# Patient Record
Sex: FEMALE | Race: WHITE | Employment: UNEMPLOYED | URBAN - METROPOLITAN AREA
[De-identification: names, ages, dates, MRNs, and addresses within clinical notes are randomized per-mention and may not be internally consistent; named-entity substitution may affect disease eponyms.]

---

## 2021-12-06 ENCOUNTER — HOSPITAL ENCOUNTER (INPATIENT)
Age: 61
LOS: 5 days | Discharge: NURSING, REHAB OR LONG-TERM CARE | DRG: 723 | End: 2021-12-11
Attending: EMERGENCY MEDICINE | Admitting: INTERNAL MEDICINE
Payer: COMMERCIAL

## 2021-12-06 ENCOUNTER — APPOINTMENT (OUTPATIENT)
Dept: CT IMAGING | Age: 61
DRG: 723 | End: 2021-12-06
Payer: COMMERCIAL

## 2021-12-06 ENCOUNTER — APPOINTMENT (OUTPATIENT)
Dept: GENERAL RADIOLOGY | Age: 61
DRG: 723 | End: 2021-12-06
Payer: COMMERCIAL

## 2021-12-06 DIAGNOSIS — R41.82 ALTERED MENTAL STATUS, UNSPECIFIED ALTERED MENTAL STATUS TYPE: Primary | ICD-10-CM

## 2021-12-06 LAB
ADENOVIRUS BY PCR: NOT DETECTED
ALBUMIN SERPL-MCNC: 3.8 G/DL (ref 3.5–5.2)
ALP BLD-CCNC: 113 U/L (ref 35–104)
ALT SERPL-CCNC: 15 U/L (ref 0–32)
ANION GAP SERPL CALCULATED.3IONS-SCNC: 11 MMOL/L (ref 7–16)
AST SERPL-CCNC: 41 U/L (ref 0–31)
BACTERIA: NORMAL /HPF
BASOPHILS ABSOLUTE: 0.06 E9/L (ref 0–0.2)
BASOPHILS RELATIVE PERCENT: 0.7 % (ref 0–2)
BILIRUB SERPL-MCNC: 0.3 MG/DL (ref 0–1.2)
BILIRUBIN URINE: NEGATIVE
BLOOD, URINE: NEGATIVE
BORDETELLA PARAPERTUSSIS BY PCR: NOT DETECTED
BORDETELLA PERTUSSIS BY PCR: NOT DETECTED
BUN BLDV-MCNC: 8 MG/DL (ref 6–23)
CALCIUM SERPL-MCNC: 8.7 MG/DL (ref 8.6–10.2)
CHLAMYDOPHILIA PNEUMONIAE BY PCR: NOT DETECTED
CHLORIDE BLD-SCNC: 99 MMOL/L (ref 98–107)
CLARITY: CLEAR
CO2: 21 MMOL/L (ref 22–29)
COLOR: YELLOW
CORONAVIRUS 229E BY PCR: NOT DETECTED
CORONAVIRUS HKU1 BY PCR: NOT DETECTED
CORONAVIRUS NL63 BY PCR: NOT DETECTED
CORONAVIRUS OC43 BY PCR: NOT DETECTED
CREAT SERPL-MCNC: 0.7 MG/DL (ref 0.5–1)
EKG ATRIAL RATE: 107 BPM
EKG P AXIS: 64 DEGREES
EKG P-R INTERVAL: 144 MS
EKG Q-T INTERVAL: 320 MS
EKG QRS DURATION: 72 MS
EKG QTC CALCULATION (BAZETT): 427 MS
EKG R AXIS: 68 DEGREES
EKG T AXIS: 64 DEGREES
EKG VENTRICULAR RATE: 107 BPM
EOSINOPHILS ABSOLUTE: 0.01 E9/L (ref 0.05–0.5)
EOSINOPHILS RELATIVE PERCENT: 0.1 % (ref 0–6)
EPITHELIAL CELLS, UA: NORMAL /HPF
GFR AFRICAN AMERICAN: >60
GFR NON-AFRICAN AMERICAN: >60 ML/MIN/1.73
GLUCOSE BLD-MCNC: 104 MG/DL (ref 74–99)
GLUCOSE URINE: NEGATIVE MG/DL
HCT VFR BLD CALC: 42.1 % (ref 34–48)
HEMOGLOBIN: 14.4 G/DL (ref 11.5–15.5)
HUMAN METAPNEUMOVIRUS BY PCR: NOT DETECTED
HUMAN RHINOVIRUS/ENTEROVIRUS BY PCR: DETECTED
IMMATURE GRANULOCYTES #: 0.02 E9/L
IMMATURE GRANULOCYTES %: 0.2 % (ref 0–5)
INFLUENZA A BY PCR: NOT DETECTED
INFLUENZA B BY PCR: NOT DETECTED
KETONES, URINE: NEGATIVE MG/DL
LACTIC ACID: 1.2 MMOL/L (ref 0.5–2.2)
LEUKOCYTE ESTERASE, URINE: NEGATIVE
LIPASE: 17 U/L (ref 13–60)
LYMPHOCYTES ABSOLUTE: 2.5 E9/L (ref 1.5–4)
LYMPHOCYTES RELATIVE PERCENT: 29.9 % (ref 20–42)
MCH RBC QN AUTO: 31.4 PG (ref 26–35)
MCHC RBC AUTO-ENTMCNC: 34.2 % (ref 32–34.5)
MCV RBC AUTO: 91.9 FL (ref 80–99.9)
MONOCYTES ABSOLUTE: 0.87 E9/L (ref 0.1–0.95)
MONOCYTES RELATIVE PERCENT: 10.4 % (ref 2–12)
MYCOPLASMA PNEUMONIAE BY PCR: NOT DETECTED
NEUTROPHILS ABSOLUTE: 4.91 E9/L (ref 1.8–7.3)
NEUTROPHILS RELATIVE PERCENT: 58.7 % (ref 43–80)
NITRITE, URINE: NEGATIVE
PARAINFLUENZA VIRUS 1 BY PCR: NOT DETECTED
PARAINFLUENZA VIRUS 2 BY PCR: NOT DETECTED
PARAINFLUENZA VIRUS 3 BY PCR: NOT DETECTED
PARAINFLUENZA VIRUS 4 BY PCR: NOT DETECTED
PDW BLD-RTO: 12.5 FL (ref 11.5–15)
PH UA: 7 (ref 5–9)
PLATELET # BLD: 185 E9/L (ref 130–450)
PMV BLD AUTO: 9.9 FL (ref 7–12)
POTASSIUM REFLEX MAGNESIUM: 5.5 MMOL/L (ref 3.5–5)
POTASSIUM SERPL-SCNC: 4.3 MMOL/L (ref 3.5–5)
PROTEIN UA: NEGATIVE MG/DL
RBC # BLD: 4.58 E12/L (ref 3.5–5.5)
RBC UA: NORMAL /HPF (ref 0–2)
REASON FOR REJECTION: NORMAL
REJECTED TEST: NORMAL
RESPIRATORY SYNCYTIAL VIRUS BY PCR: NOT DETECTED
SARS-COV-2, NAAT: NOT DETECTED
SARS-COV-2, PCR: NOT DETECTED
SODIUM BLD-SCNC: 131 MMOL/L (ref 132–146)
SPECIFIC GRAVITY UA: 1.02 (ref 1–1.03)
TOTAL CK: 753 U/L (ref 20–180)
TOTAL PROTEIN: 8 G/DL (ref 6.4–8.3)
UROBILINOGEN, URINE: 0.2 E.U./DL
WBC # BLD: 8.4 E9/L (ref 4.5–11.5)
WBC UA: NORMAL /HPF (ref 0–5)

## 2021-12-06 PROCEDURE — 80053 COMPREHEN METABOLIC PANEL: CPT

## 2021-12-06 PROCEDURE — 0202U NFCT DS 22 TRGT SARS-COV-2: CPT

## 2021-12-06 PROCEDURE — 70450 CT HEAD/BRAIN W/O DYE: CPT

## 2021-12-06 PROCEDURE — 81001 URINALYSIS AUTO W/SCOPE: CPT

## 2021-12-06 PROCEDURE — 87040 BLOOD CULTURE FOR BACTERIA: CPT

## 2021-12-06 PROCEDURE — 84132 ASSAY OF SERUM POTASSIUM: CPT

## 2021-12-06 PROCEDURE — 1200000000 HC SEMI PRIVATE

## 2021-12-06 PROCEDURE — 71045 X-RAY EXAM CHEST 1 VIEW: CPT

## 2021-12-06 PROCEDURE — 87635 SARS-COV-2 COVID-19 AMP PRB: CPT

## 2021-12-06 PROCEDURE — 2580000003 HC RX 258: Performed by: EMERGENCY MEDICINE

## 2021-12-06 PROCEDURE — 99283 EMERGENCY DEPT VISIT LOW MDM: CPT

## 2021-12-06 PROCEDURE — 85025 COMPLETE CBC W/AUTO DIFF WBC: CPT

## 2021-12-06 PROCEDURE — 82550 ASSAY OF CK (CPK): CPT

## 2021-12-06 PROCEDURE — 83605 ASSAY OF LACTIC ACID: CPT

## 2021-12-06 PROCEDURE — 83690 ASSAY OF LIPASE: CPT

## 2021-12-06 PROCEDURE — 93005 ELECTROCARDIOGRAM TRACING: CPT | Performed by: EMERGENCY MEDICINE

## 2021-12-06 PROCEDURE — 93010 ELECTROCARDIOGRAM REPORT: CPT | Performed by: INTERNAL MEDICINE

## 2021-12-06 RX ORDER — ATORVASTATIN CALCIUM 20 MG/1
20 TABLET, FILM COATED ORAL NIGHTLY
Status: DISCONTINUED | OUTPATIENT
Start: 2021-12-06 | End: 2021-12-12 | Stop reason: HOSPADM

## 2021-12-06 RX ORDER — SODIUM CHLORIDE 0.9 % (FLUSH) 0.9 %
10 SYRINGE (ML) INJECTION EVERY 12 HOURS SCHEDULED
Status: DISCONTINUED | OUTPATIENT
Start: 2021-12-06 | End: 2021-12-12 | Stop reason: HOSPADM

## 2021-12-06 RX ORDER — RISPERIDONE 2 MG/1
2 TABLET, FILM COATED ORAL DAILY
Status: ON HOLD | COMMUNITY
End: 2021-12-10 | Stop reason: SDUPTHER

## 2021-12-06 RX ORDER — PANTOPRAZOLE SODIUM 40 MG/1
40 TABLET, DELAYED RELEASE ORAL
Status: DISCONTINUED | OUTPATIENT
Start: 2021-12-07 | End: 2021-12-12 | Stop reason: HOSPADM

## 2021-12-06 RX ORDER — ONDANSETRON 4 MG/1
4 TABLET, ORALLY DISINTEGRATING ORAL EVERY 8 HOURS PRN
Status: DISCONTINUED | OUTPATIENT
Start: 2021-12-06 | End: 2021-12-12 | Stop reason: HOSPADM

## 2021-12-06 RX ORDER — LEVOTHYROXINE SODIUM 0.03 MG/1
25 TABLET ORAL DAILY
Status: DISCONTINUED | OUTPATIENT
Start: 2021-12-07 | End: 2021-12-12 | Stop reason: HOSPADM

## 2021-12-06 RX ORDER — OMEPRAZOLE 20 MG/1
20 CAPSULE, DELAYED RELEASE ORAL DAILY
COMMUNITY

## 2021-12-06 RX ORDER — LISINOPRIL 20 MG/1
40 TABLET ORAL DAILY
Status: DISCONTINUED | OUTPATIENT
Start: 2021-12-07 | End: 2021-12-12 | Stop reason: HOSPADM

## 2021-12-06 RX ORDER — SODIUM CHLORIDE 9 MG/ML
INJECTION, SOLUTION INTRAVENOUS CONTINUOUS
Status: DISCONTINUED | OUTPATIENT
Start: 2021-12-06 | End: 2021-12-12 | Stop reason: HOSPADM

## 2021-12-06 RX ORDER — POTASSIUM CHLORIDE 20 MEQ/1
40 TABLET, EXTENDED RELEASE ORAL PRN
Status: DISCONTINUED | OUTPATIENT
Start: 2021-12-06 | End: 2021-12-12 | Stop reason: HOSPADM

## 2021-12-06 RX ORDER — SODIUM CHLORIDE 0.9 % (FLUSH) 0.9 %
10 SYRINGE (ML) INJECTION PRN
Status: DISCONTINUED | OUTPATIENT
Start: 2021-12-06 | End: 2021-12-12 | Stop reason: HOSPADM

## 2021-12-06 RX ORDER — POTASSIUM CHLORIDE 7.45 MG/ML
10 INJECTION INTRAVENOUS PRN
Status: DISCONTINUED | OUTPATIENT
Start: 2021-12-06 | End: 2021-12-12 | Stop reason: HOSPADM

## 2021-12-06 RX ORDER — LORAZEPAM 1 MG/1
1 TABLET ORAL 2 TIMES DAILY
COMMUNITY

## 2021-12-06 RX ORDER — LEVOTHYROXINE SODIUM 0.03 MG/1
25 TABLET ORAL DAILY
COMMUNITY

## 2021-12-06 RX ORDER — HYDRALAZINE HYDROCHLORIDE 20 MG/ML
5 INJECTION INTRAMUSCULAR; INTRAVENOUS EVERY 6 HOURS PRN
Status: DISCONTINUED | OUTPATIENT
Start: 2021-12-06 | End: 2021-12-12 | Stop reason: HOSPADM

## 2021-12-06 RX ORDER — ACETAMINOPHEN 650 MG/1
650 SUPPOSITORY RECTAL EVERY 6 HOURS PRN
Status: DISCONTINUED | OUTPATIENT
Start: 2021-12-06 | End: 2021-12-12 | Stop reason: HOSPADM

## 2021-12-06 RX ORDER — PROPRANOLOL HYDROCHLORIDE 20 MG/1
20 TABLET ORAL DAILY
COMMUNITY

## 2021-12-06 RX ORDER — LISINOPRIL 40 MG/1
40 TABLET ORAL DAILY
COMMUNITY

## 2021-12-06 RX ORDER — AMOXICILLIN 875 MG/1
875 TABLET, COATED ORAL 2 TIMES DAILY
Status: DISCONTINUED | OUTPATIENT
Start: 2021-12-07 | End: 2021-12-07

## 2021-12-06 RX ORDER — ACETAMINOPHEN 325 MG/1
650 TABLET ORAL EVERY 6 HOURS PRN
Status: DISCONTINUED | OUTPATIENT
Start: 2021-12-06 | End: 2021-12-12 | Stop reason: HOSPADM

## 2021-12-06 RX ORDER — 0.9 % SODIUM CHLORIDE 0.9 %
500 INTRAVENOUS SOLUTION INTRAVENOUS ONCE
Status: COMPLETED | OUTPATIENT
Start: 2021-12-06 | End: 2021-12-06

## 2021-12-06 RX ORDER — PROPRANOLOL HYDROCHLORIDE 20 MG/1
20 TABLET ORAL DAILY
Status: DISCONTINUED | OUTPATIENT
Start: 2021-12-07 | End: 2021-12-12 | Stop reason: HOSPADM

## 2021-12-06 RX ORDER — AMOXICILLIN 875 MG/1
875 TABLET, COATED ORAL 2 TIMES DAILY
Status: ON HOLD | COMMUNITY
End: 2021-12-10 | Stop reason: HOSPADM

## 2021-12-06 RX ORDER — MULTIVITAMIN WITH IRON
1 TABLET ORAL DAILY
Status: DISCONTINUED | OUTPATIENT
Start: 2021-12-07 | End: 2021-12-12 | Stop reason: HOSPADM

## 2021-12-06 RX ORDER — OLANZAPINE 10 MG/1
30 TABLET ORAL NIGHTLY
Status: ON HOLD | COMMUNITY
End: 2021-12-10 | Stop reason: SDUPTHER

## 2021-12-06 RX ORDER — FOLIC ACID/MV,IRON,MIN/LUTEIN 0.4-18-25
400 TABLET ORAL DAILY
COMMUNITY

## 2021-12-06 RX ORDER — OMEPRAZOLE 20 MG/1
20 CAPSULE, DELAYED RELEASE ORAL DAILY
Status: DISCONTINUED | OUTPATIENT
Start: 2021-12-07 | End: 2021-12-06 | Stop reason: CLARIF

## 2021-12-06 RX ORDER — RISPERIDONE 2 MG/1
2 TABLET, FILM COATED ORAL DAILY
Status: DISCONTINUED | OUTPATIENT
Start: 2021-12-07 | End: 2021-12-12 | Stop reason: HOSPADM

## 2021-12-06 RX ORDER — OLANZAPINE 10 MG/1
30 TABLET ORAL NIGHTLY
Status: DISCONTINUED | OUTPATIENT
Start: 2021-12-06 | End: 2021-12-12 | Stop reason: HOSPADM

## 2021-12-06 RX ORDER — ONDANSETRON 2 MG/ML
4 INJECTION INTRAMUSCULAR; INTRAVENOUS EVERY 6 HOURS PRN
Status: DISCONTINUED | OUTPATIENT
Start: 2021-12-06 | End: 2021-12-12 | Stop reason: HOSPADM

## 2021-12-06 RX ORDER — ATORVASTATIN CALCIUM 20 MG/1
20 TABLET, FILM COATED ORAL NIGHTLY
COMMUNITY

## 2021-12-06 RX ORDER — SODIUM CHLORIDE 9 MG/ML
25 INJECTION, SOLUTION INTRAVENOUS PRN
Status: DISCONTINUED | OUTPATIENT
Start: 2021-12-06 | End: 2021-12-12 | Stop reason: HOSPADM

## 2021-12-06 RX ORDER — CETIRIZINE HYDROCHLORIDE 10 MG/1
10 TABLET ORAL NIGHTLY
COMMUNITY

## 2021-12-06 RX ORDER — FOLIC ACID/MV,IRON,MIN/LUTEIN 0.4-18-25
400 TABLET ORAL DAILY
Status: DISCONTINUED | OUTPATIENT
Start: 2021-12-07 | End: 2021-12-06 | Stop reason: CLARIF

## 2021-12-06 RX ADMIN — SODIUM CHLORIDE 500 ML: 9 INJECTION, SOLUTION INTRAVENOUS at 10:00

## 2021-12-06 NOTE — ED NOTES
Bed: 12  Expected date:   Expected time:   Means of arrival:   Comments:  ems     Zeke Elliott RN  12/06/21 [id number]

## 2021-12-06 NOTE — ED PROVIDER NOTES
answer questions who is all 4 extremities spontaneously   Psychiatric:         Mood and Affect: Mood normal.         Behavior: Behavior normal.          Procedures     MDM  Number of Diagnoses or Management Options  Altered mental status, unspecified altered mental status type  Diagnosis management comments: Patient is a 33-year-old female who is a history of tobacco use disorder in reviewing her previous chart which was limited has a history of schizophrenia lives in a group home. Patient apparently was seen by visiting physicians earlier in the week had some pharyngitis was treated antibiotic and since then has been having worsening function. Patient was found to be covered in her own stool as well as urine she was nonverbal although had no focal neurological deficit. Spoke with the medicine team who agreed admit patient at that time I did not have the film array back reviewing this chart the patient did test positive for rhinovirus. Electrolytes were stable no signs of cystitis nor any type of intracranial bleed or other pathology. Patient cannot care for herself at this point time denied passing excisions therefore she was admitted for further evaluation.                     --------------------------------------------- PAST HISTORY ---------------------------------------------  Past Medical History:  has a past medical history of Smoking 1/2 pack a day or less. Past Surgical History:  has no past surgical history on file. Social History:      Family History: family history is not on file. The patients home medications have been reviewed. Allergies: Patient has no known allergies.     -------------------------------------------------- RESULTS -------------------------------------------------    LABS:  Results for orders placed or performed during the hospital encounter of 12/06/21   COVID-19, Rapid    Specimen: Nasopharyngeal Swab   Result Value Ref Range    SARS-CoV-2, NAAT Not Detected Not Detected   Respiratory Panel, Molecular, with COVID-19 (Restricted: peds pts or suitable admitted adults)    Specimen: Nasopharyngeal   Result Value Ref Range    Adenovirus by PCR Not Detected Not Detected    Bordetella parapertussis by PCR Not Detected Not Detected    Bordetella pertussis by PCR Not Detected Not Detected    Chlamydophilia pneumoniae by PCR Not Detected Not Detected    Coronavirus 229E by PCR Not Detected Not Detected    Coronavirus HKU1 by PCR Not Detected Not Detected    Coronavirus NL63 by PCR Not Detected Not Detected    Coronavirus OC43 by PCR Not Detected Not Detected    SARS-CoV-2, PCR Not Detected Not Detected    Human Metapneumovirus by PCR Not Detected Not Detected    Human Rhinovirus/Enterovirus by PCR DETECTED (A) Not Detected    Influenza A by PCR Not Detected Not Detected    Influenza B by PCR Not Detected Not Detected    Mycoplasma pneumoniae by PCR Not Detected Not Detected    Parainfluenza Virus 1 by PCR Not Detected Not Detected    Parainfluenza Virus 2 by PCR Not Detected Not Detected    Parainfluenza Virus 3 by PCR Not Detected Not Detected    Parainfluenza Virus 4 by PCR Not Detected Not Detected    Respiratory Syncytial Virus by PCR Not Detected Not Detected   Comprehensive Metabolic Panel w/ Reflex to MG   Result Value Ref Range    Sodium 131 (L) 132 - 146 mmol/L    Potassium reflex Magnesium 5.5 (H) 3.5 - 5.0 mmol/L    Chloride 99 98 - 107 mmol/L    CO2 21 (L) 22 - 29 mmol/L    Anion Gap 11 7 - 16 mmol/L    Glucose 104 (H) 74 - 99 mg/dL    BUN 8 6 - 23 mg/dL    CREATININE 0.7 0.5 - 1.0 mg/dL    GFR Non-African American >60 >=60 mL/min/1.73    GFR African American >60     Calcium 8.7 8.6 - 10.2 mg/dL    Total Protein 8.0 6.4 - 8.3 g/dL    Albumin 3.8 3.5 - 5.2 g/dL    Total Bilirubin 0.3 0.0 - 1.2 mg/dL    Alkaline Phosphatase 113 (H) 35 - 104 U/L    ALT 15 0 - 32 U/L    AST 41 (H) 0 - 31 U/L   Lactic Acid, Plasma   Result Value Ref Range    Lactic Acid 1.2 0.5 - 2.2 mmol/L CK   Result Value Ref Range    Total  (H) 20 - 180 U/L   Urinalysis with Microscopic   Result Value Ref Range    Color, UA Yellow Straw/Yellow    Clarity, UA Clear Clear    Glucose, Ur Negative Negative mg/dL    Bilirubin Urine Negative Negative    Ketones, Urine Negative Negative mg/dL    Specific Gravity, UA 1.020 1.005 - 1.030    Blood, Urine Negative Negative    pH, UA 7.0 5.0 - 9.0    Protein, UA Negative Negative mg/dL    Urobilinogen, Urine 0.2 <2.0 E.U./dL    Nitrite, Urine Negative Negative    Leukocyte Esterase, Urine Negative Negative    WBC, UA NONE 0 - 5 /HPF    RBC, UA NONE 0 - 2 /HPF    Epithelial Cells, UA NONE SEEN /HPF    Bacteria, UA NONE SEEN None Seen /HPF   Lipase   Result Value Ref Range    Lipase 17 13 - 60 U/L   CBC auto differential   Result Value Ref Range    WBC 8.4 4.5 - 11.5 E9/L    RBC 4.58 3.50 - 5.50 E12/L    Hemoglobin 14.4 11.5 - 15.5 g/dL    Hematocrit 42.1 34.0 - 48.0 %    MCV 91.9 80.0 - 99.9 fL    MCH 31.4 26.0 - 35.0 pg    MCHC 34.2 32.0 - 34.5 %    RDW 12.5 11.5 - 15.0 fL    Platelets 032 753 - 003 E9/L    MPV 9.9 7.0 - 12.0 fL    Neutrophils % 58.7 43.0 - 80.0 %    Immature Granulocytes % 0.2 0.0 - 5.0 %    Lymphocytes % 29.9 20.0 - 42.0 %    Monocytes % 10.4 2.0 - 12.0 %    Eosinophils % 0.1 0.0 - 6.0 %    Basophils % 0.7 0.0 - 2.0 %    Neutrophils Absolute 4.91 1.80 - 7.30 E9/L    Immature Granulocytes # 0.02 E9/L    Lymphocytes Absolute 2.50 1.50 - 4.00 E9/L    Monocytes Absolute 0.87 0.10 - 0.95 E9/L    Eosinophils Absolute 0.01 (L) 0.05 - 0.50 E9/L    Basophils Absolute 0.06 0.00 - 0.20 E9/L   SPECIMEN REJECTION   Result Value Ref Range    Rejected Test CBCWD     Reason for Rejection see below    Potassium   Result Value Ref Range    Potassium 4.3 3.5 - 5.0 mmol/L   Basic Metabolic Panel w/ Reflex to MG   Result Value Ref Range    Sodium 131 (L) 132 - 146 mmol/L    Potassium reflex Magnesium 4.3 3.5 - 5.0 mmol/L    Chloride 98 98 - 107 mmol/L    CO2 22 22 - 29 mmol/L    Anion Gap 11 7 - 16 mmol/L    Glucose 97 74 - 99 mg/dL    BUN 12 6 - 23 mg/dL    CREATININE 1.0 0.5 - 1.0 mg/dL    GFR Non-African American 56 >=60 mL/min/1.73    GFR African American >60     Calcium 8.5 (L) 8.6 - 10.2 mg/dL   CBC auto differential   Result Value Ref Range    WBC 8.4 4.5 - 11.5 E9/L    RBC 4.13 3.50 - 5.50 E12/L    Hemoglobin 12.8 11.5 - 15.5 g/dL    Hematocrit 38.5 34.0 - 48.0 %    MCV 93.2 80.0 - 99.9 fL    MCH 31.0 26.0 - 35.0 pg    MCHC 33.2 32.0 - 34.5 %    RDW 12.3 11.5 - 15.0 fL    Platelets 105 357 - 203 E9/L    MPV 10.3 7.0 - 12.0 fL    Neutrophils % 55.5 43.0 - 80.0 %    Immature Granulocytes % 0.2 0.0 - 5.0 %    Lymphocytes % 31.0 20.0 - 42.0 %    Monocytes % 12.5 (H) 2.0 - 12.0 %    Eosinophils % 0.0 0.0 - 6.0 %    Basophils % 0.8 0.0 - 2.0 %    Neutrophils Absolute 4.63 1.80 - 7.30 E9/L    Immature Granulocytes # 0.02 E9/L    Lymphocytes Absolute 2.59 1.50 - 4.00 E9/L    Monocytes Absolute 1.04 (H) 0.10 - 0.95 E9/L    Eosinophils Absolute 0.00 (L) 0.05 - 0.50 E9/L    Basophils Absolute 0.07 0.00 - 0.20 E9/L   Lactic acid, plasma   Result Value Ref Range    Lactic Acid 0.6 0.5 - 2.2 mmol/L   POCT Glucose   Result Value Ref Range    Meter Glucose 101 (H) 74 - 99 mg/dL   EKG 12 Lead   Result Value Ref Range    Ventricular Rate 107 BPM    Atrial Rate 107 BPM    P-R Interval 144 ms    QRS Duration 72 ms    Q-T Interval 320 ms    QTc Calculation (Bazett) 427 ms    P Axis 64 degrees    R Axis 68 degrees    T Axis 64 degrees       RADIOLOGY:  XR CHEST PORTABLE   Final Result   No acute process. CT HEAD WO CONTRAST   Final Result   No acute intracranial abnormality.   Specifically, there is no acute   intracranial hemorrhage                 ------------------------- NURSING NOTES AND VITALS REVIEWED ---------------------------  Date / Time Roomed:  12/6/2021  9:22 AM  ED Bed Assignment:  [id number]    The nursing notes within the ED encounter and vital signs as below have been reviewed. Patient Vitals for the past 24 hrs:   BP Temp Temp src Pulse Resp SpO2 Height Weight   21 0530 (!) 126/59 100 °F (37.8 °C) Axillary 105 18 92 % -- --   21 0404 (!) 94/52 -- -- -- -- 94 % -- --   21 0345 (!) 92/54 100 °F (37.8 °C) Axillary 114 18 (!) 89 % -- --   21 0135 -- 100.7 °F (38.2 °C) Axillary -- -- -- -- --   21 0115 -- 102.5 °F (39.2 °C) Oral -- -- -- -- --   21 2330 -- -- -- -- -- -- 5' 4\" (1.626 m) 189 lb 3.2 oz (85.8 kg)   21 1930 (!) 160/104 -- Oral 109 18 94 % -- --   21 1014 -- -- -- -- -- 97 % -- --   21 1006 (!) 127/90 98 °F (36.7 °C) Oral 102 18 -- -- --       Oxygen Saturation Interpretation: Normal    ------------------------------------------ PROGRESS NOTES ------------------------------------------  Re-evaluation(s):  Time: 111  Patients symptoms show no change  Repeat physical examination is not changed    Counseling:  I have spoken with the patient and discussed todays results, in addition to providing specific details for the plan of care and counseling regarding the diagnosis and prognosis. Their questions are answered at this time and they are agreeable with the plan of admission.    --------------------------------- ADDITIONAL PROVIDER NOTES ---------------------------------  Consultations:  Spoke with Pato Chaves Discussed case. They will admit the patient. This patient's ED course included: a personal history and physicial examination, re-evaluation prior to disposition, multiple bedside re-evaluations, IV medications, cardiac monitoring, continuous pulse oximetry and complex medical decision making and emergency management    This patient has remained hemodynamically stable during their ED course. Diagnosis:  1. Altered mental status, unspecified altered mental status type        Disposition:  Patient's disposition: Admit to med/surg floor  Patient's condition is stable.        Royetta Apgar, DO  21 [phone]

## 2021-12-07 LAB
ANION GAP SERPL CALCULATED.3IONS-SCNC: 11 MMOL/L (ref 7–16)
BASOPHILS ABSOLUTE: 0.07 E9/L (ref 0–0.2)
BASOPHILS RELATIVE PERCENT: 0.8 % (ref 0–2)
BUN BLDV-MCNC: 12 MG/DL (ref 6–23)
CALCIUM SERPL-MCNC: 8.5 MG/DL (ref 8.6–10.2)
CHLORIDE BLD-SCNC: 98 MMOL/L (ref 98–107)
CO2: 22 MMOL/L (ref 22–29)
CREAT SERPL-MCNC: 1 MG/DL (ref 0.5–1)
EOSINOPHILS ABSOLUTE: 0 E9/L (ref 0.05–0.5)
EOSINOPHILS RELATIVE PERCENT: 0 % (ref 0–6)
GFR AFRICAN AMERICAN: >60
GFR NON-AFRICAN AMERICAN: 56 ML/MIN/1.73
GLUCOSE BLD-MCNC: 97 MG/DL (ref 74–99)
HCT VFR BLD CALC: 38.5 % (ref 34–48)
HEMOGLOBIN: 12.8 G/DL (ref 11.5–15.5)
IMMATURE GRANULOCYTES #: 0.02 E9/L
IMMATURE GRANULOCYTES %: 0.2 % (ref 0–5)
LACTIC ACID: 0.6 MMOL/L (ref 0.5–2.2)
LYMPHOCYTES ABSOLUTE: 2.59 E9/L (ref 1.5–4)
LYMPHOCYTES RELATIVE PERCENT: 31 % (ref 20–42)
MCH RBC QN AUTO: 31 PG (ref 26–35)
MCHC RBC AUTO-ENTMCNC: 33.2 % (ref 32–34.5)
MCV RBC AUTO: 93.2 FL (ref 80–99.9)
METER GLUCOSE: 101 MG/DL (ref 74–99)
MONOCYTES ABSOLUTE: 1.04 E9/L (ref 0.1–0.95)
MONOCYTES RELATIVE PERCENT: 12.5 % (ref 2–12)
NEUTROPHILS ABSOLUTE: 4.63 E9/L (ref 1.8–7.3)
NEUTROPHILS RELATIVE PERCENT: 55.5 % (ref 43–80)
PDW BLD-RTO: 12.3 FL (ref 11.5–15)
PLATELET # BLD: 182 E9/L (ref 130–450)
PMV BLD AUTO: 10.3 FL (ref 7–12)
POTASSIUM REFLEX MAGNESIUM: 4.3 MMOL/L (ref 3.5–5)
RBC # BLD: 4.13 E12/L (ref 3.5–5.5)
SODIUM BLD-SCNC: 131 MMOL/L (ref 132–146)
WBC # BLD: 8.4 E9/L (ref 4.5–11.5)

## 2021-12-07 PROCEDURE — 6360000002 HC RX W HCPCS: Performed by: FAMILY MEDICINE

## 2021-12-07 PROCEDURE — 6370000000 HC RX 637 (ALT 250 FOR IP): Performed by: INTERNAL MEDICINE

## 2021-12-07 PROCEDURE — 6370000000 HC RX 637 (ALT 250 FOR IP): Performed by: PHYSICIAN ASSISTANT

## 2021-12-07 PROCEDURE — 83605 ASSAY OF LACTIC ACID: CPT

## 2021-12-07 PROCEDURE — 2580000003 HC RX 258: Performed by: INTERNAL MEDICINE

## 2021-12-07 PROCEDURE — 2060000000 HC ICU INTERMEDIATE R&B

## 2021-12-07 PROCEDURE — 6370000000 HC RX 637 (ALT 250 FOR IP): Performed by: NURSE PRACTITIONER

## 2021-12-07 PROCEDURE — 2580000003 HC RX 258: Performed by: PHYSICIAN ASSISTANT

## 2021-12-07 PROCEDURE — 6360000002 HC RX W HCPCS: Performed by: PHYSICIAN ASSISTANT

## 2021-12-07 PROCEDURE — 85025 COMPLETE CBC W/AUTO DIFF WBC: CPT

## 2021-12-07 PROCEDURE — 2580000003 HC RX 258: Performed by: FAMILY MEDICINE

## 2021-12-07 PROCEDURE — 36415 COLL VENOUS BLD VENIPUNCTURE: CPT

## 2021-12-07 PROCEDURE — 82962 GLUCOSE BLOOD TEST: CPT

## 2021-12-07 PROCEDURE — 80048 BASIC METABOLIC PNL TOTAL CA: CPT

## 2021-12-07 PROCEDURE — 2580000003 HC RX 258: Performed by: NURSE PRACTITIONER

## 2021-12-07 PROCEDURE — 99232 SBSQ HOSP IP/OBS MODERATE 35: CPT | Performed by: INTERNAL MEDICINE

## 2021-12-07 RX ORDER — SODIUM CHLORIDE 9 MG/ML
INJECTION, SOLUTION INTRAVENOUS EVERY 8 HOURS
Status: DISCONTINUED | OUTPATIENT
Start: 2021-12-07 | End: 2021-12-12 | Stop reason: HOSPADM

## 2021-12-07 RX ORDER — ACETAMINOPHEN 650 MG/1
650 SUPPOSITORY RECTAL ONCE
Status: COMPLETED | OUTPATIENT
Start: 2021-12-07 | End: 2021-12-07

## 2021-12-07 RX ORDER — 0.9 % SODIUM CHLORIDE 0.9 %
1000 INTRAVENOUS SOLUTION INTRAVENOUS ONCE
Status: COMPLETED | OUTPATIENT
Start: 2021-12-07 | End: 2021-12-07

## 2021-12-07 RX ADMIN — SODIUM CHLORIDE: 9 INJECTION, SOLUTION INTRAVENOUS at 07:52

## 2021-12-07 RX ADMIN — PIPERACILLIN AND TAZOBACTAM 3375 MG: 3; .375 INJECTION, POWDER, LYOPHILIZED, FOR SOLUTION INTRAVENOUS at 14:44

## 2021-12-07 RX ADMIN — ACETAMINOPHEN 650 MG: 650 SUPPOSITORY RECTAL at 01:35

## 2021-12-07 RX ADMIN — SODIUM CHLORIDE: 9 INJECTION, SOLUTION INTRAVENOUS at 18:31

## 2021-12-07 RX ADMIN — ACETAMINOPHEN 650 MG: 650 SUPPOSITORY RECTAL at 05:17

## 2021-12-07 RX ADMIN — ACETAMINOPHEN 650 MG: 650 SUPPOSITORY RECTAL at 20:05

## 2021-12-07 RX ADMIN — SODIUM CHLORIDE: 9 INJECTION, SOLUTION INTRAVENOUS at 01:18

## 2021-12-07 RX ADMIN — ACETAMINOPHEN 650 MG: 650 SUPPOSITORY RECTAL at 11:32

## 2021-12-07 RX ADMIN — SODIUM CHLORIDE 1000 ML: 9 INJECTION, SOLUTION INTRAVENOUS at 05:18

## 2021-12-07 RX ADMIN — ENOXAPARIN SODIUM 40 MG: 100 INJECTION SUBCUTANEOUS at 08:22

## 2021-12-07 RX ADMIN — MULTIVITAMIN TABLET 1 TABLET: TABLET at 08:23

## 2021-12-07 RX ADMIN — Medication 10 ML: at 01:20

## 2021-12-07 RX ADMIN — Medication 10 ML: at 20:11

## 2021-12-07 RX ADMIN — SODIUM CHLORIDE 1000 ML: 9 INJECTION, SOLUTION INTRAVENOUS at 05:51

## 2021-12-07 RX ADMIN — PIPERACILLIN AND TAZOBACTAM 3375 MG: 3; .375 INJECTION, POWDER, LYOPHILIZED, FOR SOLUTION INTRAVENOUS at 22:02

## 2021-12-07 ASSESSMENT — PAIN SCALES - GENERAL
PAINLEVEL_OUTOF10: 0

## 2021-12-07 NOTE — SIGNIFICANT EVENT
RRT called  High MEWS  borderline BP  sbp    Repeat temp >100    Prior vitals  BP (!) 94/52 Comment: manual  Pulse 114   Temp 100 °F (37.8 °C) (Axillary)   Resp 18   Ht 5' 4\" (1.626 m)   Wt 189 lb 3.2 oz (85.8 kg)   SpO2 94%   BMI 32.48 kg/m²   heent lethargic  Chest coarse b/l  Heart tachy  abd soft    Monitor sinus    Admitted with +RHINO virus  WBC N  Likely sepsis /dehydratio assoc with URI    On amoxil started IV fluids  Blood cultures pending  UA neg  cxr--  lungs are without acute focal process.  There is no effusion or   pneumothorax.       NOT hypoxic  dbout pneumonia    Order IV fluids  Give another tylenol  Defer CCT  Charge [id number]

## 2021-12-07 NOTE — PATIENT CARE CONFERENCE
[facility] Quality Flow/Interdisciplinary Rounds Progress Note        Quality Flow Rounds held on December 7, 2021    Disciplines Attending:  Bedside Nurse, ,  and Nursing Unit Leadership    Johnathon Leon was admitted on 12/6/2021  9:22 AM    Anticipated Discharge Date:  Expected Discharge Date: 12/09/21    Disposition:    Clark Score:  Clark Scale Score: 14    Readmission Risk              Risk of Unplanned Readmission:  14           Discussed patient goal for the day, patient clinical progression, and barriers to discharge.   The following Goal(s) of the Day/Commitment(s) have been identified:  Diagnostics - Report Results      Shalini Simms RN  December 7, 2021

## 2021-12-07 NOTE — CARE COORDINATION
Social Work discharge [address] called [employer] [phone] and was asked to call back later today to talk to their supervisor re: pt.    Electronically signed by CHRISTIANO Trimble on 12/7/2021 at 1:15 PM

## 2021-12-07 NOTE — PROGRESS NOTES
Spoke with Gogo Ahumada at [facility] [place] to verify patient medications and admission documentation as patient is unable to verbalize at this time.      Electronically signed by Jhonathan Hyde RN on 12/6/2021 at 10:06 PM

## 2021-12-07 NOTE — PROGRESS NOTES
Called and left a message for patients sister, Leland Gomez, informing her of patients transfer.  Also called Yue Oneil (manager at [employer]) and let her know of transfer per patient request.

## 2021-12-07 NOTE — H&P
nightly  amoxicillin (AMOXIL) 875 MG tablet, Take 875 mg by mouth 2 times daily 8 days left    Note that the patient's home medications were reviewed and the above list is accurate to the best of my knowledge at the time of the exam.    Allergies:    Patient has no known allergies. Social History:    Unable to obtain    Family History:   Unable to obtain      PHYSICAL EXAM:    Vitals:  BP (!) 126/59   Pulse 105   Temp 100 °F (37.8 °C) (Axillary)   Resp 18   Ht 5' 4\" (1.626 m)   Wt 189 lb 3.2 oz (85.8 kg)   SpO2 92%   BMI 32.48 kg/m²       General appearance: completely altered, mumbles gibberish, ill appearing  Eyes: Sclerae anicteric, PERRLA  HEENT: AT/NC, MMM, dry  Neck: FROM, supple, no thyromegaly  Lymph: No cervical / supraclavicular lymphadenopathy  Lungs: Clear to auscultation, WOB normal  CV: Irregular, no MRGs, no lower extremity edema  Abdomen: Soft, non-tender; no masses or HSM, +BS  Extremities: FROM without synovitis. No clubbing or cyanosis of the hands. Skin: no rash, induration, lesions, or ulcers  Psych:Unable to assess   Neuro: Alert     LABS:  All labs reviewed.   Of note:  CBC with Differential:    Lab Results   Component Value Date    WBC 8.4 12/07/2021    RBC 4.13 12/07/2021    HGB 12.8 12/07/2021    HCT 38.5 12/07/2021     12/07/2021    MCV 93.2 12/07/2021    MCH 31.0 12/07/2021    MCHC 33.2 12/07/2021    RDW 12.3 12/07/2021    LYMPHOPCT 31.0 12/07/2021    MONOPCT 12.5 12/07/2021    BASOPCT 0.8 12/07/2021    MONOSABS 1.04 12/07/2021    LYMPHSABS 2.59 12/07/2021    EOSABS 0.00 12/07/2021    BASOSABS 0.07 12/07/2021     CMP:    Lab Results   Component Value Date     12/07/2021    K 4.3 12/07/2021    CL 98 12/07/2021    CO2 22 12/07/2021    BUN 12 12/07/2021    CREATININE 1.0 12/07/2021    GFRAA >60 12/07/2021    LABGLOM 56 12/07/2021    GLUCOSE 97 12/07/2021    PROT 8.0 12/06/2021    LABALBU 3.8 12/06/2021    CALCIUM 8.5 12/07/2021    BILITOT 0.3 12/06/2021    ALKPHOS 113 12/06/2021    AST 41 12/06/2021    ALT 15 12/06/2021       Imaging:  CXR: No acute process. CT head: No acute intracranial abnormality. EKG:  Sinus tach    Telemetry:  I've personally reviewed the patient's telemetry:      ASSESSMENT/PLAN:  Principal Problem:    Altered mental status, unspecified  Resolved Problems:    * No resolved hospital problems. *    22-year-old female admitted to [place] [address] unit with    Altered mental status/Rhinovirus  -MRI brain r/o cva  -Telemetry monitoring   -Monitor blood pressure-adjust medications as needed. -IV hydration  -Zosyn pending pan cultures   -Swallow study for possible aspiration  -Monitor WBC/procal/lactic acid/temps--unremarkable  Transferred to tele     Medication for other comorbidities continue as appropriate dose adjustment as necessary. DVT prophylaxis  PT OT  Discharge planning  Case discussed with attending and agreed upon plan of care. Code status: Full  Requires inpatient level of care  KULDEEP Hughes CNP    6:31 AM  12/7/2021     Above note edited to reflect my thoughts     I personally saw, examined and provided care for the patient. Radiographs, labs and medication list were reviewed by me independently. The case was discussed in detail and plans for care were established. Review of Danielle STARKS CNP, documentation was conducted and revisions were made as appropriate directly by me. I agree with the above documented exam, problem list, and plan of care.      Otis Greenfield MD  6:18 PM  12/7/2021

## 2021-12-07 NOTE — PROGRESS NOTES
Attempted to [address] [facility] [address] Monique Lopez to give update on patients vitals after bolus was given. Unable to get through to her. Vitals stable and nothing new to report. Still waiting on tele bed.

## 2021-12-07 NOTE — PROGRESS NOTES
Notified April Jade of patient status after RRT. Still no beds available for transfer.      Electronically signed by Joel Lockwood RN on 12/7/2021 at 5:13 AM

## 2021-12-07 NOTE — PROGRESS NOTES
Notified Yoli Hansen that no tele beds are available for patient transfer.  Called clinical manager and notified her of intermediate transfer request.     Electronically signed by Xin Bray RN on 12/7/2021 at 12:31 AM

## 2021-12-07 NOTE — PROGRESS NOTES
Pharmacy Note    Davonte Tao was ordered B-2 Riboflavin. As per the [address] [place] [address], herbals and certain dietary supplements will be discontinued. The herbal or dietary supplement may be continued after discharge from the hospital.  Thank You, [address] [place] [address]. D 12/6/2021 11:06 PM

## 2021-12-08 ENCOUNTER — APPOINTMENT (OUTPATIENT)
Dept: MRI IMAGING | Age: 61
DRG: 723 | End: 2021-12-08
Payer: COMMERCIAL

## 2021-12-08 ENCOUNTER — APPOINTMENT (OUTPATIENT)
Dept: CT IMAGING | Age: 61
DRG: 723 | End: 2021-12-08
Payer: COMMERCIAL

## 2021-12-08 LAB
ALBUMIN SERPL-MCNC: 3.5 G/DL (ref 3.5–5.2)
ALP BLD-CCNC: 96 U/L (ref 35–104)
ALT SERPL-CCNC: 13 U/L (ref 0–32)
ANION GAP SERPL CALCULATED.3IONS-SCNC: 12 MMOL/L (ref 7–16)
AST SERPL-CCNC: 24 U/L (ref 0–31)
BILIRUB SERPL-MCNC: 0.4 MG/DL (ref 0–1.2)
BUN BLDV-MCNC: 11 MG/DL (ref 6–23)
CALCIUM SERPL-MCNC: 8.4 MG/DL (ref 8.6–10.2)
CHLORIDE BLD-SCNC: 102 MMOL/L (ref 98–107)
CO2: 19 MMOL/L (ref 22–29)
CREAT SERPL-MCNC: 0.8 MG/DL (ref 0.5–1)
GFR AFRICAN AMERICAN: >60
GFR NON-AFRICAN AMERICAN: >60 ML/MIN/1.73
GLUCOSE BLD-MCNC: 83 MG/DL (ref 74–99)
POTASSIUM SERPL-SCNC: 4.2 MMOL/L (ref 3.5–5)
PROCALCITONIN: 0.06 NG/ML (ref 0–0.08)
SODIUM BLD-SCNC: 133 MMOL/L (ref 132–146)
TOTAL CK: 446 U/L (ref 20–180)
TOTAL PROTEIN: 7.1 G/DL (ref 6.4–8.3)

## 2021-12-08 PROCEDURE — 2580000003 HC RX 258: Performed by: FAMILY MEDICINE

## 2021-12-08 PROCEDURE — 36415 COLL VENOUS BLD VENIPUNCTURE: CPT

## 2021-12-08 PROCEDURE — 97165 OT EVAL LOW COMPLEX 30 MIN: CPT

## 2021-12-08 PROCEDURE — 6370000000 HC RX 637 (ALT 250 FOR IP): Performed by: PHYSICIAN ASSISTANT

## 2021-12-08 PROCEDURE — 2060000000 HC ICU INTERMEDIATE R&B

## 2021-12-08 PROCEDURE — 6360000002 HC RX W HCPCS: Performed by: INTERNAL MEDICINE

## 2021-12-08 PROCEDURE — 80053 COMPREHEN METABOLIC PANEL: CPT

## 2021-12-08 PROCEDURE — 70551 MRI BRAIN STEM W/O DYE: CPT

## 2021-12-08 PROCEDURE — 82550 ASSAY OF CK (CPK): CPT

## 2021-12-08 PROCEDURE — 6360000002 HC RX W HCPCS: Performed by: PHYSICIAN ASSISTANT

## 2021-12-08 PROCEDURE — 70450 CT HEAD/BRAIN W/O DYE: CPT

## 2021-12-08 PROCEDURE — 2580000003 HC RX 258: Performed by: INTERNAL MEDICINE

## 2021-12-08 PROCEDURE — 6360000002 HC RX W HCPCS: Performed by: FAMILY MEDICINE

## 2021-12-08 PROCEDURE — 2580000003 HC RX 258: Performed by: PHYSICIAN ASSISTANT

## 2021-12-08 PROCEDURE — 97161 PT EVAL LOW COMPLEX 20 MIN: CPT

## 2021-12-08 PROCEDURE — 84145 PROCALCITONIN (PCT): CPT

## 2021-12-08 RX ORDER — LEVETIRACETAM 5 MG/ML
500 INJECTION INTRAVASCULAR EVERY 12 HOURS
Status: DISCONTINUED | OUTPATIENT
Start: 2021-12-08 | End: 2021-12-08 | Stop reason: CLARIF

## 2021-12-08 RX ADMIN — SODIUM CHLORIDE: 9 INJECTION, SOLUTION INTRAVENOUS at 03:06

## 2021-12-08 RX ADMIN — ENOXAPARIN SODIUM 40 MG: 100 INJECTION SUBCUTANEOUS at 09:25

## 2021-12-08 RX ADMIN — PIPERACILLIN AND TAZOBACTAM 3375 MG: 3; .375 INJECTION, POWDER, LYOPHILIZED, FOR SOLUTION INTRAVENOUS at 06:32

## 2021-12-08 RX ADMIN — VANCOMYCIN HYDROCHLORIDE 1500 MG: 500 INJECTION, POWDER, LYOPHILIZED, FOR SOLUTION INTRAVENOUS at 22:12

## 2021-12-08 RX ADMIN — ACETAMINOPHEN 650 MG: 650 SUPPOSITORY RECTAL at 04:48

## 2021-12-08 RX ADMIN — SODIUM CHLORIDE: 9 INJECTION, SOLUTION INTRAVENOUS at 10:30

## 2021-12-08 RX ADMIN — Medication 10 ML: at 09:25

## 2021-12-08 RX ADMIN — LEVETIRACETAM: 100 INJECTION INTRAVENOUS at 19:11

## 2021-12-08 RX ADMIN — PIPERACILLIN AND TAZOBACTAM 3375 MG: 3; .375 INJECTION, POWDER, LYOPHILIZED, FOR SOLUTION INTRAVENOUS at 13:25

## 2021-12-08 RX ADMIN — SODIUM CHLORIDE: 9 INJECTION, SOLUTION INTRAVENOUS at 18:00

## 2021-12-08 RX ADMIN — ACETAMINOPHEN 650 MG: 650 SUPPOSITORY RECTAL at 17:02

## 2021-12-08 RX ADMIN — Medication 10 ML: at 19:06

## 2021-12-08 ASSESSMENT — PAIN SCALES - GENERAL: PAINLEVEL_OUTOF10: 0

## 2021-12-08 NOTE — PLAN OF CARE
Problem: Skin Integrity:  Goal: Will show no infection signs and symptoms  Description: Will show no infection signs and symptoms  12/8/2021 1143 by Paco Walton RN  Outcome: Met This Shift     Problem: Skin Integrity:  Goal: Absence of new skin breakdown  Description: Absence of new skin breakdown  12/8/2021 1143 by Paco Walton RN  Outcome: Met This Shift     Problem: Falls - Risk of:  Goal: Will remain free from falls  Description: Will remain free from falls  12/8/2021 1143 by Paco Walton RN  Outcome: Met This Shift     Problem: Falls - Risk of:  Goal: Absence of physical injury  Description: Absence of physical injury  12/8/2021 1143 by Paco Walton RN  Outcome: Met This Shift

## 2021-12-08 NOTE — CARE COORDINATION
Social Work discharge planning   Sw spoke to [employer] [phone]. He advised pt is baseline independent with adls and ambulation. He said she negotiates a flight of steps at home independently. Rosalie Juarez confirmed pt's schizophrenia diagnosis, as noted in chart from [facility] 12/20/20. Her PCP is [facility]. Rosalie Juarez advised pt does NOT have a guardian nor dpoa. PT OT ordered. Rosalie Juarez advised they do not have a specific snf they work with. Rosalie Juarez advised they will have to review pt's medical needs before she could return. Pt may need SNF depending on PT OT and/or iv atb plan.   Electronically signed by Pedro Gallardo on 12/8/2021 at 11:04 AM

## 2021-12-08 NOTE — PLAN OF CARE
Problem: Skin Integrity:  Goal: Will show no infection signs and symptoms  Description: Will show no infection signs and symptoms  12/8/2021 0043 by Iven Goodell, RN  Outcome: Met This Shift     Problem: Skin Integrity:  Goal: Absence of new skin breakdown  Description: Absence of new skin breakdown  12/8/2021 0043 by Iven Goodell, RN  Outcome: Met This Shift     Problem: Falls - Risk of:  Goal: Will remain free from falls  Description: Will remain free from falls  12/8/2021 0043 by Iven Goodell, RN  Outcome: Met This Shift     Problem: Falls - Risk of:  Goal: Absence of physical injury  Description: Absence of physical injury  12/8/2021 0043 by Iven Goodell, RN  Outcome: Met This Shift

## 2021-12-08 NOTE — PROGRESS NOTES
-Consulted to dose vancomycin for sepsis of unknown origin.  -CrCl 61 mL/min.  -Will give vancomycin loading dose of 1500 mg IV x 1 now. -Will initiate standing order of vancomycin 1250 mg IV q24h to begin on 12/9 at 0800.  -Projected AUC/BRITTA on this regimen is 442. Carlos Gross monitor renal function and steady state vancomycin level when appropriate.

## 2021-12-08 NOTE — PROGRESS NOTES
Physical Therapy    Facility/Department: [address] INTERMEDIATE 1  Initial Assessment    NAME: Todd Ram  : 1960  MRN: [id number]    Date of Service: 2021      Patient Diagnosis(es): The encounter diagnosis was Altered mental status, unspecified altered mental status type. has a past medical history of Smoking 1/2 pack a day or less. has no past surgical history on file. Evaluating Therapist: Brittany Davis PT      Room #:  2882/8938-A  Diagnosis:  Altered mental status, unspecified altered mental status type [R41.82]  Altered mental status, unspecified [R41.82]  PMHx/PSHx:  schizophrenia  Precautions:  Falls, contact and droplet isolation, alarm      Social:  Pt admitted from group home. Pt unable to report prior level of function. Per chart independent with ambulation     Initial Evaluation  Date: 21 Treatment      Short Term/ Long Term   Goals   Was pt agreeable to Eval/treatment? yes     Does pt have pain? No indication of pain     Bed Mobility  Rolling: max assist  Supine to sit: max assist  Sit to supine: max assist  Scooting: max assist  Min assist   Transfers Sit to stand: NT  Stand to sit: NT  Stand pivot: NT  Min assist   Ambulation    NT  15 feet with AAD with min assist   Stair Negotiation  Ascended and descended  NT      LE strength     Pt does not follow command. Grossly 3-/5        balance      Sitting balance fair     AM-PAC Raw score               8/24         Pt is alert. Answers yes/no question. Inconsistent with command following  LE ROM: WFL  Sensation: NT  Edema: none       ASSESSMENT:    Pt displays functional ability as noted in the objective portion of this evaluation. Patient education  Pt educated on importance of mobiltiy    Patient response to education:   Pt verbalized understanding Pt demonstrated skill Pt requires further education in this area   no   yes       Comments:  Pt assisted to sit to edge of bed. CG for sitting balance.  Once sitting up pt began to return self to supine. Pt with difficulty following instruction. Pt's/ family goals   1. Pt unable to state    Conditions Requiring Skilled Therapeutic Intervention:    [x]Decreased strength     []Decreased ROM  [x]Decreased functional mobility  [x]Decreased balance   [x]Decreased endurance   []Decreased posture  []Decreased sensation  []Decreased coordination   []Decreased vision  []Decreased safety awareness   []Increased pain       Patient and or family understand(s) diagnosis, prognosis, and plan of care. No    Prognosis is fair for reaching above PT goals    PHYSICAL THERAPY PLAN OF CARE:    PT POC is established based on physician order and patient diagnosis     Referring provider/PT Order: KENDELL Dimas/ PT eval and treat      Current Treatment Recommendations:     [x] Strengthening to improve independence with functional mobility   [] ROM to improve independence with functional mobility   [x] Balance Training to improve static/dynamic balance and to reduce fall risk  [x] Endurance Training to improve activity tolerance during functional mobility   [x] Transfer Training to improve safety and independence with all functional transfers   [x] Gait Training to improve gait mechanics, endurance and assess need for appropriate assistive device  [] Stair Training in preparation for safe discharge home and/or into the community   [] Positioning to prevent skin breakdown and contractures  [x] Safety and Education Training   [x] Patient/Caregiver Education   [] HEP  [] Other     PT long term treatment goals are located in above grid    Frequency of treatments: 2-5x/week x 5-7 days.     Time in  1115  Time out  1130      Evaluation Time includes thorough review of current medical information, gathering information on past medical history/social history and prior level of function, completion of standardized testing/informal observation of tasks, assessment of data and education on plan of care and goals.      CPT codes:  [x] Low Complexity PT evaluation 35820  [] Moderate Complexity PT evaluation 51381  [] High Complexity PT evaluation 19249  [] PT Re-evaluation 10241  [] Gait training 14457 minutes  [] Manual therapy 65022 minutes  [] Therapeutic activities 61791 minutes  [] Therapeutic exercises 57731 minutes  [] Neuromuscular reeducation 22244 minutes     Cindi PT [id number]

## 2021-12-08 NOTE — PROGRESS NOTES
Spoke w/ patient's sister Michael Alexanderneil and updated her on patient's status and plan of care. Reviewed over MRI screening checklist w/ Michael Barajas. All questions/concerns addressed and answered at this time.

## 2021-12-08 NOTE — PROGRESS NOTES
Subjective: The patient is awake and alert. No acute events overnight. Denies chest pain, angina, SOB     Objective:    /75   Pulse 98   Temp 99.6 °F (37.6 °C) (Axillary)   Resp 18   Ht 5' 4\" (1.626 m)   Wt 190 lb 3.2 oz (86.3 kg)   SpO2 96%   BMI 32.65 kg/m²     No intake/output data recorded. No intake/output data recorded. General appearance: NAD  HEENT: AT/NC, MMM  Neck: FROM, supple  Lungs: Clear to auscultation  CV: RRR, no MRGs  Vasc: Radial pulses 2+  Abdomen: Soft, non-tender; no masses or HSM  Extremities: No peripheral edema or digital cyanosis  Skin: no rash, lesions or ulcers  Psych: calm   Neuro: Alert to self only, patient does not follow commands. Recent Labs     12/06/21  1709 12/07/21  0410   WBC 8.4 8.4   HGB 14.4 12.8   HCT 42.1 38.5    182       Recent Labs     12/06/21  1017 12/06/21  1400 12/07/21  0410   *  --  131*   K 5.5* 4.3 4.3   CL 99  --  98   CO2 21*  --  22   BUN 8  --  12   CREATININE 0.7  --  1.0   CALCIUM 8.7  --  8.5*       Assessment:    Principal Problem:    Altered mental status, unspecified  Resolved Problems:    * No resolved hospital problems. *      Plan:    77-year-old female admitted to [facility] [address] unit with     Altered mental status/Rhinovirus  -MRI brain r/o cva  -Monitor blood pressure-adjust medications as needed. -IVF NS @ 75   -IV Zosyn pending pan cultures   -Monitor WBC/procal/lactic acid/temps--unremarkable    DVT Prophylaxis Lovenox  PT/OT  Discharge planning       KULDEEP Gamble - CNP  2:39 PM  12/8/2021     Alerted by nurse at bedside that pt;'s right eye is ?  Newly deviated to right   mentation is different from the group home  Stat head ct negative , also negative ion admission   Mri pending still   Labs are essentially normal , ckt toal mildly elevated at 440   pan cultures d/t fevers  Stop antipsychotics   Place on iv keppra empirically, prn ativan for seizures or agitation   Add vanc dose by pharmacy Check am labs      Above note edited to reflect my thoughts     I personally saw, examined and provided care for the patient. Radiographs, labs and medication list were reviewed by me independently. The case was discussed in detail and plans for care were established. Review of RAMONITA Garcia   , documentation was conducted and revisions were made as appropriate directly by me. I agree with the above documented exam, problem list, and plan of care.      Jenny Wilcox MD  8:24 PM  12/8/2021

## 2021-12-08 NOTE — PROGRESS NOTES
Occupational Therapy  OCCUPATIONAL THERAPY INITIAL EVALUATION      Date:2021  Patient Name: Samantha Hull  MRN: [id number]  : 1960  Room: [address] [place], [place] [address]         AQAB:                                                  Patient Name: Samantha Hull    MRN: [id number]    : 1960    Room: [address] [place] [address]      Evaluating OT: Primo Cardona OTR/L   [id number]      Referring KENDELL Moon    Specific Provider Orders/Date:OT eval and treat 2021      Diagnosis:  Altered mental status, unspecified altered mental status type [R41.82]  Altered mental status, unspecified [R41.82]     Pertinent Medical History: schizophrenia     Precautions:  Fall Risk, alarm, DROPLET      Assessment of current deficits    [x] Functional mobility  [x]ADLs  [x] Strength               [x]Cognition    [x] Functional transfers   [x] IADLs         [x] Safety Awareness   [x]Endurance    [x] Fine Coordination              [x] Balance      [] Vision/perception   [x]Sensation     []Gross Motor Coordination  [] ROM  [] Delirium                   [] Motor Control     OT PLAN OF CARE   OT POC based on physician orders, patient diagnosis and results of clinical assessment    Frequency/Duration  2-3 days/wk for 2 weeks PRN   Specific OT Treatment Interventions to include:   ADL retraining/adapted techniques and AE recommendations to increase functional independence within precautions                    Energy conservation techniques to improve tolerance for selfcare routine   Functional transfer/mobility training/DME recommendations for increased independence, safety and fall prevention         Patient/family education to increase safety and functional independence             Environmental modifications for safe mobility and completion of ADLs                             Therapeutic activity to improve functional performance during ADLs. Therapeutic exercise to improve tolerance and functional strength for ADLs    Balance retraining/tolerance tasks for facilitation of postural control with dynamic challenges during ADLs . Positioning to improve functional independence  []Cognitive retraining ex's to improve problem solving skills & safe participation in ADLs/IADLs       Recommended Adaptive Equipment: TBD     SOCIAL  Patient poor historian:   per chart:    Patient from [facility] group home   Baseline Independent with ADLs, and ambulation         Pain Level: no pain observed ;   Cognition: A&O: to person.  Patient only verbalizing  Yes/no responses   Inconsistent with following commands    Memory:  poor   Sequencing:  poor   Problem solving:  poor   Judgement/safety:  poor     Functional Assessment:  AM-PAC Daily Activity Raw Score: 7/24   Initial Eval Status  Date: 12/8/21 Treatment Status  Date: STGs = LTGs  Time frame: 10-14 days   Feeding Max A/dependent  Attempt to place fork or cup in patients hands  Patient able to grasp cup with L hand - assist with bringing to mouth - unble to hold onto fork   SB A   Grooming MaxA/dependent   Min A   UB Dressing MaxA/dependent   Min A   LB Dressing Dependent   Mod A    Bathing Max A/dependent   Mod A    Toileting Dependent      Bed Mobility  Max A  Supine <> sit   Min A   Functional Transfers Patient sat EOB - xfers not attempted - patient wanting to return to bed , initiated lifting legs and starting to lean back   - patient assist safely back to bed   Min A  A   Functional Mobility NT   Min A with good tolerance    Balance Sitting:     Static:  Min A  Standing: NT   SBA - sitting   Min A -standing    Activity Tolerance Fair- with light activity   No SOB noted   Good  with ADL activity    Visual/  Perceptual Glasses: none by bedsid e                Hand Dominance    AROM (PROM) Strength Additional Info:    R/L UE Difficult formally assessing UE AROM   Patient demonstrated AROM - at times resistive or confused       Hearing: [facility]   Sensation:  No c/o numbness or tingling   Tone: WFL   Edema: none observed     Comments: Upon arrival patient lying in bed . At end of session, patient returned to bed  with call light and phone within reach, all lines and tubes intact. *ALARM ON      Overall patient demonstrated  decreased independence and safety during completion of ADL/functional transfer/mobility tasks. Pt would benefit from continued skilled OT to increase safety and independence with completion of ADL/IADL tasks for functional independence and quality of life. Rehab Potential: good for established goals     Patient / Family Goal: none stated       Patient and/or family were instructed on functional diagnosis, prognosis/goals and OT plan of care. Demonstrated poor  understanding. Eval Complexity: Low    Time In: 1117  Time Out: 1132      Min Units   OT Eval Low 97165 x  1   OT Eval Medium 18913      OT Eval High 43446      OT Re-Eval [id number]       Therapeutic Ex 21841       Therapeutic Activities 36498       ADL/Self Care 12644       Orthotic Management 62851       Manual 97585     Neuro Re-Ed 01130       Non-Billable Time          Evaluation Time additionally includes thorough review of current medical information, gathering information on past medical history/social history and prior level of function, interpretation of standardized testing/informal observation of tasks, assessment of data and development of plan of care and goals.             Robin Napoles  OTR/L  OT [id number]

## 2021-12-09 LAB
ALBUMIN SERPL-MCNC: 3.4 G/DL (ref 3.5–5.2)
ALP BLD-CCNC: 94 U/L (ref 35–104)
ALT SERPL-CCNC: 14 U/L (ref 0–32)
ANION GAP SERPL CALCULATED.3IONS-SCNC: 11 MMOL/L (ref 7–16)
AST SERPL-CCNC: 26 U/L (ref 0–31)
BILIRUB SERPL-MCNC: 0.4 MG/DL (ref 0–1.2)
BUN BLDV-MCNC: 10 MG/DL (ref 6–23)
C-REACTIVE PROTEIN: 0.6 MG/DL (ref 0–0.4)
CALCIUM SERPL-MCNC: 8.3 MG/DL (ref 8.6–10.2)
CHLORIDE BLD-SCNC: 103 MMOL/L (ref 98–107)
CO2: 21 MMOL/L (ref 22–29)
CREAT SERPL-MCNC: 0.7 MG/DL (ref 0.5–1)
GFR AFRICAN AMERICAN: >60
GFR NON-AFRICAN AMERICAN: >60 ML/MIN/1.73
GLUCOSE BLD-MCNC: 86 MG/DL (ref 74–99)
POTASSIUM SERPL-SCNC: 4.2 MMOL/L (ref 3.5–5)
SODIUM BLD-SCNC: 135 MMOL/L (ref 132–146)
TOTAL CK: 439 U/L (ref 20–180)
TOTAL PROTEIN: 6.9 G/DL (ref 6.4–8.3)

## 2021-12-09 PROCEDURE — 6360000002 HC RX W HCPCS: Performed by: PHYSICIAN ASSISTANT

## 2021-12-09 PROCEDURE — 82550 ASSAY OF CK (CPK): CPT

## 2021-12-09 PROCEDURE — 2060000000 HC ICU INTERMEDIATE R&B

## 2021-12-09 PROCEDURE — 80053 COMPREHEN METABOLIC PANEL: CPT

## 2021-12-09 PROCEDURE — 97530 THERAPEUTIC ACTIVITIES: CPT

## 2021-12-09 PROCEDURE — 6370000000 HC RX 637 (ALT 250 FOR IP): Performed by: PHYSICIAN ASSISTANT

## 2021-12-09 PROCEDURE — 86140 C-REACTIVE PROTEIN: CPT

## 2021-12-09 PROCEDURE — 2580000003 HC RX 258: Performed by: INTERNAL MEDICINE

## 2021-12-09 PROCEDURE — 6360000002 HC RX W HCPCS: Performed by: FAMILY MEDICINE

## 2021-12-09 PROCEDURE — 36415 COLL VENOUS BLD VENIPUNCTURE: CPT

## 2021-12-09 PROCEDURE — 2580000003 HC RX 258: Performed by: PHYSICIAN ASSISTANT

## 2021-12-09 PROCEDURE — 2580000003 HC RX 258: Performed by: FAMILY MEDICINE

## 2021-12-09 PROCEDURE — 6360000002 HC RX W HCPCS: Performed by: INTERNAL MEDICINE

## 2021-12-09 RX ADMIN — PIPERACILLIN AND TAZOBACTAM 3375 MG: 3; .375 INJECTION, POWDER, LYOPHILIZED, FOR SOLUTION INTRAVENOUS at 08:09

## 2021-12-09 RX ADMIN — PIPERACILLIN AND TAZOBACTAM 3375 MG: 3; .375 INJECTION, POWDER, LYOPHILIZED, FOR SOLUTION INTRAVENOUS at 17:53

## 2021-12-09 RX ADMIN — Medication 10 ML: at 08:09

## 2021-12-09 RX ADMIN — SODIUM CHLORIDE: 9 INJECTION, SOLUTION INTRAVENOUS at 12:22

## 2021-12-09 RX ADMIN — PIPERACILLIN AND TAZOBACTAM 3375 MG: 3; .375 INJECTION, POWDER, LYOPHILIZED, FOR SOLUTION INTRAVENOUS at 00:25

## 2021-12-09 RX ADMIN — ACETAMINOPHEN 650 MG: 650 SUPPOSITORY RECTAL at 09:45

## 2021-12-09 RX ADMIN — LEVETIRACETAM: 100 INJECTION INTRAVENOUS at 16:21

## 2021-12-09 RX ADMIN — LEVETIRACETAM: 100 INJECTION INTRAVENOUS at 06:40

## 2021-12-09 RX ADMIN — ENOXAPARIN SODIUM 40 MG: 100 INJECTION SUBCUTANEOUS at 08:09

## 2021-12-09 RX ADMIN — VANCOMYCIN HYDROCHLORIDE 1250 MG: 10 INJECTION, POWDER, LYOPHILIZED, FOR SOLUTION INTRAVENOUS at 22:10

## 2021-12-09 NOTE — PROGRESS NOTES
Spoke w/ Dena Mason, patient's daily nurse at group home. Updated on patient's status and plan of care. All questions/concerns addressed and answered at this time.

## 2021-12-09 NOTE — PROGRESS NOTES
Pharmacy Consultation Note  (Antibiotic Dosing and Monitoring)    Initial consult date: 12/8/2021  Consulting physician/provider: Dr. Kelly Hidden  Drug: Vancomycin  Indication: Sepsis    Age/  Gender Height Weight IBW  Allergy Information   61 y.o./female 5' 4\" (162.6 cm) 189 lb 3.2 oz (85.8 kg)     Ideal body weight: 54.7 kg (120 lb 9.5 oz)  Adjusted ideal body weight: 67.3 kg (148 lb 7 oz)   Patient has no known allergies. Renal Function:  Recent Labs     12/07/21  0410 12/08/21  1924 12/09/21  0420   BUN 12 11 10   CREATININE 1.0 0.8 0.7     No intake or output data in the 24 hours ending 12/09/21 1255    Vancomycin Monitoring:  Trough:  No results for input(s): VANCOTROUGH in the last 72 hours. Random:  No results for input(s): VANCORANDOM in the last 72 hours. Vancomycin Administration Times:  Recent vancomycin administrations                   vancomycin 1500 mg in dextrose 5% 300 mL IVPB (mg) 1,500 mg New Bag 12/08/21 2212                Assessment:  · Patient is a 59 y.o. female who has been initiated on vancomycin  · Estimated Creatinine Clearance: 87 mL/min (based on SCr of 0.7 mg/dL).   · To dose vancomycin, pharmacy will be utilizing Quat-E calculation software for goal AUC/BRITTA 400-600 mg/L-hr    Plan:  · Will continue vancomycin 1250 mg IV every 24 hours  · Will check vancomycin levels when appropriate  · Will continue to monitor renal function   · Clinical pharmacy to follow    Melly Knowles PharmD, BCCCP  12/9/2021  12:56 PM

## 2021-12-09 NOTE — PROGRESS NOTES
Multiple unsuccessful attempts made to remove dentures from patients mouth. Spoke w/ MRI and notified them this RN was unable to remove dentures prior to patient coming down for MRI.

## 2021-12-09 NOTE — CARE COORDINATION
Social Work discharge planning   Benjamin spoke to pt and sister Jatinder Mills [phone]. Cullen Flaherty said she herself lives in [place], and that pt is the oldest child of 9. Yanna Nuñez wants pt to go to snf where she will be closest to [place] from group home. Benjamin spoke to [place] at group home [phone] or [phone]. She advised they want pt to go to [facility], and that she already spoke to one of their admissions persons. Benjamin called Lily liaison for [facility] and made referral. Await their magdaleno for pt and bed availability. N17 started. Electronically signed by Pedro Gallardo on 12/9/2021 at 11:27 AM     Addendum-    Benjamin spoke to Brooke Stewart, who advised pt is accepted to [facility] SNF at discharge once precert obtained. Brooke Stewart said she will start precert today. Brooke Stewart said she will NOT need a rapid covid test at discharge since her PCR test was negative. Brooke Stewart said they can accept pt with HENS (would \"trip pasarr screen\" if not). N17 started. Ambulance forms in folder. WILL NEED PRECERT AND HENS DONE AT DISCHARGE. Electronically signed by Pedro Gallardo on 12/9/2021 at 12:02 PM     Addendum-    Per Brooke Stewart with [facility], they have precert and it is good for 48 hours. Benjamin notified charge LISSA Lucero   Electronically signed by Pedro Gallardo on 12/9/2021 at 1:00 PM

## 2021-12-09 NOTE — PROGRESS NOTES
Physical Therapy    Facility/Department: [address] INTERMEDIATE 1  Treatment   NAME: Pete Calles  : 1960  MRN: [id number]    Date of Service: 2021      Patient Diagnosis(es): The encounter diagnosis was Altered mental status, unspecified altered mental status type. has a past medical history of Smoking 1/2 pack a day or less. has no past surgical history on file. Evaluating Therapist: Zach Caldera PT      Room #:  1767/5699-N  Diagnosis:  Altered mental status, unspecified altered mental status type [R41.82]  Altered mental status, unspecified [R41.82]  PMHx/PSHx:  schizophrenia  Precautions:  Falls, contact and droplet isolation, alarm      Social:  Pt admitted from group home. Pt unable to report prior level of function. Per chart independent with ambulation     Initial Evaluation  Date: 21 Treatment      Short Term/ Long Term   Goals   Was pt agreeable to Eval/treatment? yes yes    Does pt have pain? No indication of pain No indication of pain     Bed Mobility  Rolling: max assist  Supine to sit: max assist  Sit to supine: max assist  Scooting: max assist Supine to sit mod  Sit to supine min   Scooting max to eob  Min assist   Transfers Sit to stand: NT  Stand to sit: NT  Stand pivot: NT Sit to stand min/  Stand to sit min  Stand pivot  nt Min assist   Ambulation    NT   NT 15 feet with AAD with min assist   Stair Negotiation  Ascended and descended  NT      LE strength     Pt does not follow command. Grossly 3-/5        balance      Sitting balance fair     AM-PAC Raw score               8/24 8        Pt is alert. Pt needed tactile cueing to get oob. Not following commands. Answers yes to all questions   LE ROM: WFL  Sensation: NT  Edema: none       ASSESSMENT:    Pt displays functional ability as noted in the objective portion of this evaluation.       Patient education  Pt educated on the weather and where she is at     Patient response to education:   Pt verbalized understanding Pt demonstrated skill Pt requires further education in this area   no   yes       Comments:  Pt assisted to sit to edge of bed with tactile cues and assist.  Pt sba sitting eob. Pt laid down one time with control and she got back up with tactile cues. Pt performed sit to stand at walker 1 time with min assist and then she stood 5 times with therapist holding her hands. Pt sat eob and then she was drifting off to sleep. Pt returned to bed and noticed she was soiled. Nursing notified. Pt limited by cognition/confusion. .       Pt's/ family goals   1. Pt unable to state    Conditions Requiring Skilled Therapeutic Intervention:    [x]Decreased strength     []Decreased ROM  [x]Decreased functional mobility  [x]Decreased balance   [x]Decreased endurance   []Decreased posture  []Decreased sensation  []Decreased coordination   []Decreased vision  []Decreased safety awareness   []Increased pain       Patient and or family understand(s) diagnosis, prognosis, and plan of care.  No    Prognosis is fair for reaching above PT goals    PHYSICAL THERAPY PLAN OF CARE:    PT POC is established based on physician order and patient diagnosis     Referring provider/PT Order: Radha Doherty, PA/ PT eval and treat      Current Treatment Recommendations:     [x] Strengthening to improve independence with functional mobility   [] ROM to improve independence with functional mobility   [x] Balance Training to improve static/dynamic balance and to reduce fall risk  [x] Endurance Training to improve activity tolerance during functional mobility   [x] Transfer Training to improve safety and independence with all functional transfers   [x] Gait Training to improve gait mechanics, endurance and assess need for appropriate assistive device  [] Stair Training in preparation for safe discharge home and/or into the community   [] Positioning to prevent skin breakdown and contractures  [x] Safety and Education Training   [x] Patient/Caregiver Education   [] HEP  [] Other     PT long term treatment goals are located in above grid    Frequency of treatments: 2-5x/week x 5-7 days.     Time in  1028 Time out  1043         CPT codes:  [] Low Complexity PT evaluation 26323  [] Moderate Complexity PT evaluation 17786  [] High Complexity PT evaluation 48798  [] PT Re-evaluation 62749  [] Gait training 50609 minutes  [] Manual therapy 76804 minutes  [x] Therapeutic activities 73869 minutes  [] Therapeutic exercises 64744 minutes  [] Neuromuscular reeducation 89182 minutes     Ninfa George, [address] [place] [address]

## 2021-12-09 NOTE — PROGRESS NOTES
Brought pt to MRI. Only able to get pre-contast scans. Patient started coughing, moving, unable to follow instructions.  Called to inform RN and sent back to room

## 2021-12-09 NOTE — PROGRESS NOTES
Subjective: The patient is awake and alert. No acute events overnight. Denies chest pain, angina, SOB   -Clinically she appears better today, is answering some questions for me  I am not sure that this is completely off her baseline    Objective:    /76   Pulse 92   Temp 99.5 °F (37.5 °C) (Axillary)   Resp 20   Ht 5' 4\" (1.626 m)   Wt 190 lb 3.2 oz (86.3 kg)   SpO2 96%   BMI 32.65 kg/m²     No intake/output data recorded. No intake/output data recorded. General appearance: NAD  HEENT: AT/NC, MMM, right eye deviation   Neck: FROM, supple  Lungs: Clear to auscultation  CV: RRR, no MRGs  Vasc: Radial pulses 2+  Abdomen: Soft, non-tender; no masses or HSM  Extremities: No peripheral edema or digital cyanosis  Skin: no rash, lesions or ulcers  Psych: calm   Neuro: Alert to self only, patient does not follow commands. Recent Labs     12/06/21  1709 12/07/21  0410   WBC 8.4 8.4   HGB 14.4 12.8   HCT 42.1 38.5    182       Recent Labs     12/07/21  0410 12/08/21  1924 12/09/21  0420   * 133 135   K 4.3 4.2 4.2   CL 98 102 103   CO2 22 19* 21*   BUN 12 11 10   CREATININE 1.0 0.8 0.7   CALCIUM 8.5* 8.4* 8.3*       Assessment:    Principal Problem:    Altered mental status, unspecified  Resolved Problems:    * No resolved hospital problems. *      Plan:    75-year-old female admitted to [place] [address] unit with     Altered mental status/Rhinovirus  -MRI brain r/o cva >> Negative for Stroke   -Monitor blood pressure-adjust medications as needed. -IVF NS @ 75   -IV Zosyn pending pan cultures   -Monitor WBC/procal/lactic acid/temps--unremarkable  -stop antipsychotics   -pharmacy to dose vanc  -IV Keppra empirically    -?EEG    DVT Prophylaxis Lovenox  PT/OT  Discharge planning       KULDEEP Mccallum - CNP  2:45 PM  12/9/2021       Above note edited to reflect my thoughts     I personally saw, examined and provided care for the patient.  Radiographs, labs and medication list were reviewed by me independently. The case was discussed in detail and plans for care were established. Review of RAMONITA Santos   , documentation was conducted and revisions were made as appropriate directly by me. I agree with the above documented exam, problem list, and plan of care.      Sinan Maria MD  7:00 PM  12/9/2021

## 2021-12-09 NOTE — DISCHARGE INSTR - COC
Continuity of Care Form    Patient Name: Maryse Green   :  1960  MRN:  [id number]    Admit date:  2021  Discharge date:  ***    Code Status Order: Full Code   Advance Directives:      Admitting Physician:  Riccardo Carter MD  PCP: [facility]    Discharging Nurse: [facility] Unit/Room#: 6268/8829-C  Discharging Unit Phone Number: [phone]    Emergency Contact:   Extended Emergency Contact Information  Primary Emergency Contact: Dina Castorena  Home Phone: [phone]  Mobile Phone: [phone]  Relation: Brother/Sister   needed? No    Past Surgical History:  No past surgical history on file. Immunization History: There is no immunization history on file for this patient.     Active Problems:  Patient Active Problem List   Diagnosis Code    Altered mental status, unspecified R41.82       Isolation/Infection:   Isolation            Contact  Droplet          Patient Infection Status       Infection Onset Added Last Indicated Last Indicated By Review Planned Expiration Resolved Resolved By    Rhinovirus 21 Respiratory Panel, Molecular, with COVID-19 (Restricted: peds pts or suitable admitted adults) 21       Resolved    COVID-19 (Rule Out) 21 Respiratory Panel, Molecular, with COVID-19 (Restricted: peds pts or suitable admitted adults) (Ordered)   21 Rule-Out Test Resulted    COVID-19 (Rule Out) 21 COVID-19, Rapid (Ordered)   21 Rule-Out Test Resulted            Nurse Assessment:  Last Vital Signs: /76   Pulse 92   Temp 99.5 °F (37.5 °C) (Axillary)   Resp 20   Ht 5' 4\" (1.626 m)   Wt 190 lb 3.2 oz (86.3 kg)   SpO2 96%   BMI 32.65 kg/m²     Last documented pain score (0-10 scale): Pain Level:  (temp 99.5)  Last Weight:   Wt Readings from Last 1 Encounters:   21 190 lb 3.2 oz (86.3 kg)     Mental Status:  {IP PT MENTAL STATUS:}    IV Access:  { IDANIA IV BJKYTI:585880736}    Nursing Mobility/ADLs:  Walking   {CHP DME JEKM:485244346}  Transfer  {CHP DME FUQF:141129174}  Bathing  {CHP DME LVBZ:938641758}  Dressing  {CHP DME CYIS:416748853}  Toileting  {CHP DME QFAT:208027858}  Feeding  {CHP DME CFI}  Med Admin  {P DME INID:723449766}  Med Delivery   {[facility] MED Delivery:733314328}    Wound Care Documentation and Therapy:        Elimination:  Continence: Bowel: {YES / GM:24299}  Bladder: {YES / ZA:56138}  Urinary Catheter: {Urinary Catheter:006861169}   Colostomy/Ileostomy/Ileal Conduit: {YES / GX:97174}       Date of Last BM: ***  No intake or output data in the 24 hours ending 21 1130  No intake/output data recorded.     Safety Concerns:     [employer] Safety Concerns:311718443}    Impairments/Disabilities:      [employer] Impairments/Disabilities:554332651}    Nutrition Therapy:  Current Nutrition Therapy:   [employer] Diet List:702139366}    Routes of Feeding: {[facility] DME Other Feedings:107992704}  Liquids: {Slp liquid thickness:69051}  Daily Fluid Restriction: {CHP DME Yes amt example:889815252}  Last Modified Barium Swallow with Video (Video Swallowing Test): {Done Not Done ZTJC:167162299}    Treatments at the Time of Hospital Discharge:   Respiratory Treatments: ***  Oxygen Therapy:  {Therapy; copd oxygen:24616}  Ventilator:    {[facility] Vent ACHZ:449664837}    Rehab Therapies: PT OT eval and treat  Weight Bearing Status/Restrictions: [email or web address]  Weight Bearin}  Other Medical Equipment (for information only, NOT a DME order):  {EQUIPMENT:213221735}  Other Treatments: ***    Patient's personal belongings (please select all that are sent with patient):  {[facility] DME Belongings:746180767}    RN SIGNATURE:  {Esignature:223731168}    CASE MANAGEMENT/SOCIAL WORK SECTION    Inpatient Status Date: ***    Readmission Risk Assessment Score:  Readmission Risk              Risk of Unplanned Readmission:  15           Discharging to Facility/ Agency   Name: [facility]

## 2021-12-10 LAB — VANCOMYCIN RANDOM: 18.9 MCG/ML (ref 5–40)

## 2021-12-10 PROCEDURE — 36415 COLL VENOUS BLD VENIPUNCTURE: CPT

## 2021-12-10 PROCEDURE — 2580000003 HC RX 258: Performed by: PHYSICIAN ASSISTANT

## 2021-12-10 PROCEDURE — 2580000003 HC RX 258: Performed by: FAMILY MEDICINE

## 2021-12-10 PROCEDURE — 2580000003 HC RX 258: Performed by: INTERNAL MEDICINE

## 2021-12-10 PROCEDURE — 80202 ASSAY OF VANCOMYCIN: CPT

## 2021-12-10 PROCEDURE — 2060000000 HC ICU INTERMEDIATE R&B

## 2021-12-10 PROCEDURE — 6370000000 HC RX 637 (ALT 250 FOR IP): Performed by: NURSE PRACTITIONER

## 2021-12-10 PROCEDURE — 6360000002 HC RX W HCPCS: Performed by: PHYSICIAN ASSISTANT

## 2021-12-10 PROCEDURE — 6360000002 HC RX W HCPCS: Performed by: INTERNAL MEDICINE

## 2021-12-10 PROCEDURE — 6360000002 HC RX W HCPCS: Performed by: FAMILY MEDICINE

## 2021-12-10 RX ORDER — RISPERIDONE 2 MG/1
TABLET, FILM COATED ORAL
Qty: 60 TABLET | Refills: 3 | Status: SHIPPED | OUTPATIENT
Start: 2021-12-10

## 2021-12-10 RX ORDER — AMOXICILLIN AND CLAVULANATE POTASSIUM 875; 125 MG/1; MG/1
1 TABLET, FILM COATED ORAL 2 TIMES DAILY
Qty: 14 TABLET | Refills: 0 | Status: SHIPPED | OUTPATIENT
Start: 2021-12-10 | End: 2021-12-17

## 2021-12-10 RX ORDER — OLANZAPINE 10 MG/1
TABLET ORAL
Qty: 30 TABLET | Refills: 3 | Status: SHIPPED | OUTPATIENT
Start: 2021-12-10

## 2021-12-10 RX ORDER — LEVETIRACETAM 500 MG/1
500 TABLET ORAL 2 TIMES DAILY
Qty: 60 TABLET | Refills: 3 | Status: SHIPPED | OUTPATIENT
Start: 2021-12-10

## 2021-12-10 RX ORDER — DOXYCYCLINE HYCLATE 100 MG
100 TABLET ORAL 2 TIMES DAILY
Qty: 14 TABLET | Refills: 0 | Status: SHIPPED | OUTPATIENT
Start: 2021-12-10 | End: 2021-12-17

## 2021-12-10 RX ADMIN — LEVETIRACETAM: 100 INJECTION INTRAVENOUS at 19:28

## 2021-12-10 RX ADMIN — PROPRANOLOL HYDROCHLORIDE 20 MG: 20 TABLET ORAL at 08:21

## 2021-12-10 RX ADMIN — MULTIVITAMIN TABLET 1 TABLET: TABLET at 08:21

## 2021-12-10 RX ADMIN — SODIUM CHLORIDE: 9 INJECTION, SOLUTION INTRAVENOUS at 19:25

## 2021-12-10 RX ADMIN — SODIUM CHLORIDE: 9 INJECTION, SOLUTION INTRAVENOUS at 14:34

## 2021-12-10 RX ADMIN — LEVOTHYROXINE SODIUM 25 MCG: 25 TABLET ORAL at 06:04

## 2021-12-10 RX ADMIN — PIPERACILLIN AND TAZOBACTAM 3375 MG: 3; .375 INJECTION, POWDER, LYOPHILIZED, FOR SOLUTION INTRAVENOUS at 00:27

## 2021-12-10 RX ADMIN — SODIUM CHLORIDE: 9 INJECTION, SOLUTION INTRAVENOUS at 19:24

## 2021-12-10 RX ADMIN — LEVETIRACETAM: 100 INJECTION INTRAVENOUS at 06:04

## 2021-12-10 RX ADMIN — ATORVASTATIN CALCIUM 20 MG: 20 TABLET, FILM COATED ORAL at 20:30

## 2021-12-10 RX ADMIN — ENOXAPARIN SODIUM 40 MG: 100 INJECTION SUBCUTANEOUS at 08:21

## 2021-12-10 RX ADMIN — LISINOPRIL 40 MG: 20 TABLET ORAL at 08:21

## 2021-12-10 RX ADMIN — PIPERACILLIN AND TAZOBACTAM 3375 MG: 3; .375 INJECTION, POWDER, LYOPHILIZED, FOR SOLUTION INTRAVENOUS at 08:22

## 2021-12-10 RX ADMIN — VANCOMYCIN HYDROCHLORIDE 1250 MG: 10 INJECTION, POWDER, LYOPHILIZED, FOR SOLUTION INTRAVENOUS at 23:15

## 2021-12-10 RX ADMIN — PANTOPRAZOLE SODIUM 40 MG: 40 TABLET, DELAYED RELEASE ORAL at 06:04

## 2021-12-10 ASSESSMENT — PAIN SCALES - GENERAL: PAINLEVEL_OUTOF10: 0

## 2021-12-10 NOTE — PROGRESS NOTES
Subjective:      No acute events overnight. Denies chest pain, angina, SOB   She is alert and able to respond to questions today    Objective:    BP (!) 141/88   Pulse 82   Temp 98.7 °F (37.1 °C) (Axillary)   Resp 18   Ht 5' 4\" (1.626 m)   Wt 190 lb 3.2 oz (86.3 kg)   SpO2 94%   BMI 32.65 kg/m²     No intake/output data recorded. No intake/output data recorded. General appearance: NAD  HEENT: AT/NC, MMM, right eye deviation   Neck: FROM, supple  Lungs: Clear to auscultation  CV: RRR, no MRGs  Vasc: Radial pulses 2+  Abdomen: Soft, non-tender; no masses or HSM  Extremities: No peripheral edema or digital cyanosis  Skin: no rash, lesions or ulcers  Psych: calm   Neuro: Alert to self only, patient does not follow commands. No results for input(s): WBC, HGB, HCT, PLT in the last 72 hours. Recent Labs     12/08/21  1924 12/09/21  0420    135   K 4.2 4.2    103   CO2 19* 21*   BUN 11 10   CREATININE 0.8 0.7   CALCIUM 8.4* 8.3*       Assessment:    Principal Problem:    Altered mental status, unspecified  Resolved Problems:    * No resolved hospital problems. *      Plan:    75-year-old female admitted to [place] [address] unit with     Altered mental status/Rhinovirus  -MRI brain r/o cva >> Negative for Stroke   -Monitor blood pressure-adjust medications as needed. -IVF NS @ 75 until patient able to tolerate p.o. intake  -IV Zosyn/vancomycin pending pan cultures -so far cultures are unremarkable  -Monitor WBC/procal/lactic acid/temps--unremarkable  -stop antipsychotics-can resume at half dose at discharge  -pharmacy to dose vanc-appreciated  -IV Keppra empirically-transition to p.o. at discharge      DVT Prophylaxis Lovenox  PT/OT  Discharge planning-from medicine standpoint she can be discharged back to facility once pre-CERT is obtained.   She is medically stable      Jay Flowers MD  3:55 PM  12/10/2021

## 2021-12-10 NOTE — CARE COORDINATION
Social Work discharge planning   Sw called pt's sister Dottie Figueroa [phone] and advised pt has been accepted to [facility] and gave her phone number. Benjamin completed 7000 exemption in [facility] now. N17 started. Ambulance forms in folder.    Electronically signed by Isai Uribe on 12/10/2021 at 10:39 AM

## 2021-12-10 NOTE — PROGRESS NOTES
Spoke to [facility] and they cant accept patient tonight. Per [facility] they will make phone calls and accept patient tomorrow 12/11.

## 2021-12-10 NOTE — DISCHARGE SUMMARY
Physician Discharge Summary     Patient ID:  Yasmine Nair  [id number]  59 y.o.  1960    Admit date: 12/6/2021    Discharge date and time: 12/11/2021    Admission Diagnoses:   Patient Active Problem List   Diagnosis    Altered mental status, unspecified       Discharge Diagnoses: Acute change in mental status, questionable seizures    Consults: None    Procedures: None    Hospital Course: 78-year-old female admitted to [place] [address] unit with     Altered mental status/Rhinovirus  -MRI brain r/o cva >> Negative for Stroke   -Monitor blood pressure-adjust medications as needed. -IVF NS @ 75 until patient able to tolerate p.o. intake-discontinued at discharge  -IV Zosyn/vancomycin pending pan cultures -so far cultures are unremarkable-transition to p.o. Augmentin and doxycycline  -Monitor WBC/procal/lactic acid/temps--unremarkable  -stop antipsychotics-can resume at half dose at discharge  -pharmacy to dose vanc-appreciated  -IV Keppra empirically-transition to p.o. at discharge    No results for input(s): WBC, HGB, HCT, PLT in the last 72 hours. Recent Labs     12/08/21 1924 12/09/21  0420    135   K 4.2 4.2    103   CO2 19* 21*   BUN 11 10   CREATININE 0.8 0.7   CALCIUM 8.4* 8.3*       CT HEAD WO CONTRAST    Result Date: 12/6/2021  EXAMINATION: CT OF THE HEAD WITHOUT CONTRAST  12/6/2021 9:45 am TECHNIQUE: CT of the head was performed without the administration of intravenous contrast. Dose modulation, iterative reconstruction, and/or weight based adjustment of the mA/kV was utilized to reduce the radiation dose to as low as reasonably achievable. COMPARISON: None. HISTORY: ORDERING SYSTEM PROVIDED HISTORY: mental status change TECHNOLOGIST PROVIDED HISTORY: Has a \"code stroke\" or \"stroke alert\" been called? ->No Reason for exam:->mental status change Decision Support Exception - unselect if not a suspected or confirmed emergency medical condition->Emergency Medical Condition (MA) FINDINGS: BRAIN/VENTRICLES: There is no acute intracranial hemorrhage, mass effect or midline shift. No abnormal extra-axial fluid collection. The gray-white differentiation is maintained without evidence of an acute infarct. There is no evidence of hydrocephalus. ORBITS: The visualized portion of the orbits demonstrate no acute abnormality. SINUSES: The visualized paranasal sinuses and mastoid air cells demonstrate no acute abnormality. SOFT TISSUES/SKULL:  No acute abnormality of the visualized skull or soft tissues. No acute intracranial abnormality. Specifically, there is no acute intracranial hemorrhage     XR CHEST PORTABLE    Result Date: 12/6/2021  EXAMINATION: ONE XRAY VIEW OF THE CHEST 12/6/2021 9:59 am COMPARISON: None. HISTORY: ORDERING SYSTEM PROVIDED HISTORY: fever TECHNOLOGIST PROVIDED HISTORY: Reason for exam:->fever FINDINGS: The lungs are without acute focal process. There is no effusion or pneumothorax. The cardiomediastinal silhouette is without acute process. The osseous structures are without acute process. No acute process. Discharge Exam:    HEENT: NCAT,  PERRLA, No JVD  Heart:  RRR, no murmurs, gallops, or rubs.   Lungs:  CTA bilaterally, no wheeze, rales or rhonchi  Abd: bowel sounds present, nontender, nondistended, no masses  Extrem:  No clubbing, cyanosis, or edema    Disposition: SNF/group home    Patient Condition at Discharge: Stable    Patient Instructions:      Medication List      START taking these medications    amoxicillin-clavulanate 875-125 MG per tablet  Commonly known as: AUGMENTIN  Take 1 tablet by mouth 2 times daily for 7 days     doxycycline hyclate 100 MG tablet  Commonly known as: VIBRA-TABS  Take 1 tablet by mouth 2 times daily for 7 days     levETIRAcetam 500 MG tablet  Commonly known as: Keppra  Take 1 tablet by mouth 2 times daily        CHANGE how you take these medications    OLANZapine 10 MG tablet  Commonly known as: ZYPREXA  Take 1 tab po nightly  What changed:   · how much to take  · how to take this  · when to take this  · additional instructions     risperiDONE 2 MG tablet  Commonly known as: RISPERDAL  Take 1/2 tab po daily  What changed:   · how much to take  · how to take this  · when to take this  · additional instructions        CONTINUE taking these medications    atorvastatin 20 MG tablet  Commonly known as: LIPITOR     CertaVite/Antioxidants Tabs     cetirizine 10 MG tablet  Commonly known as: ZYRTEC     levothyroxine 25 MCG tablet  Commonly known as: SYNTHROID     lisinopril 40 MG tablet  Commonly known as: PRINIVIL;ZESTRIL     LORazepam 1 MG tablet  Commonly known as: ATIVAN     omeprazole 20 MG delayed release capsule  Commonly known as: PRILOSEC     propranolol 20 MG tablet  Commonly known as: INDERAL     vitamin B-2 100 MG Tabs tablet  Commonly known as: RIBOFLAVIN        STOP taking these medications    amoxicillin 875 MG tablet  Commonly known as: AMOXIL           Where to Get Your Medications      [facility] [address] -  [phone]  [facility] [place] [address]    Phone: [phone]   · amoxicillin-clavulanate 875-125 MG per tablet  · doxycycline hyclate 100 MG tablet  · levETIRAcetam 500 MG tablet  · OLANZapine 10 MG tablet  · risperiDONE 2 MG tablet       Activity: activity as tolerated  Diet: regular diet    Pt has been advised to: Follow-up with [facility] in 1 week.   Follow-up with consultants as recommended by them    Note that over 30 minutes was spent in preparing discharge papers, discussing discharge with patient, medication review, etc.    Signed:  Army Kobe MD  12/11/2021  5:39 PM

## 2021-12-11 VITALS
BODY MASS INDEX: 30.71 KG/M2 | RESPIRATION RATE: 20 BRPM | HEIGHT: 64 IN | SYSTOLIC BLOOD PRESSURE: 138 MMHG | HEART RATE: 82 BPM | WEIGHT: 179.9 LBS | OXYGEN SATURATION: 100 % | TEMPERATURE: 98.7 F | DIASTOLIC BLOOD PRESSURE: 72 MMHG

## 2021-12-11 LAB
BLOOD CULTURE, ROUTINE: NORMAL
CULTURE, BLOOD 2: NORMAL

## 2021-12-11 PROCEDURE — 6360000002 HC RX W HCPCS: Performed by: INTERNAL MEDICINE

## 2021-12-11 PROCEDURE — 6360000002 HC RX W HCPCS: Performed by: FAMILY MEDICINE

## 2021-12-11 PROCEDURE — 6360000002 HC RX W HCPCS: Performed by: PHYSICIAN ASSISTANT

## 2021-12-11 PROCEDURE — 2580000003 HC RX 258: Performed by: FAMILY MEDICINE

## 2021-12-11 PROCEDURE — 2580000003 HC RX 258: Performed by: PHYSICIAN ASSISTANT

## 2021-12-11 PROCEDURE — 6370000000 HC RX 637 (ALT 250 FOR IP): Performed by: NURSE PRACTITIONER

## 2021-12-11 PROCEDURE — 2580000003 HC RX 258: Performed by: INTERNAL MEDICINE

## 2021-12-11 RX ADMIN — PROPRANOLOL HYDROCHLORIDE 20 MG: 20 TABLET ORAL at 08:07

## 2021-12-11 RX ADMIN — LEVETIRACETAM: 100 INJECTION INTRAVENOUS at 06:20

## 2021-12-11 RX ADMIN — PIPERACILLIN AND TAZOBACTAM 3375 MG: 3; .375 INJECTION, POWDER, LYOPHILIZED, FOR SOLUTION INTRAVENOUS at 16:44

## 2021-12-11 RX ADMIN — LISINOPRIL 40 MG: 20 TABLET ORAL at 08:07

## 2021-12-11 RX ADMIN — ENOXAPARIN SODIUM 40 MG: 100 INJECTION SUBCUTANEOUS at 08:10

## 2021-12-11 RX ADMIN — PIPERACILLIN AND TAZOBACTAM 3375 MG: 3; .375 INJECTION, POWDER, LYOPHILIZED, FOR SOLUTION INTRAVENOUS at 01:30

## 2021-12-11 RX ADMIN — PIPERACILLIN AND TAZOBACTAM 3375 MG: 3; .375 INJECTION, POWDER, LYOPHILIZED, FOR SOLUTION INTRAVENOUS at 08:09

## 2021-12-11 RX ADMIN — MULTIVITAMIN TABLET 1 TABLET: TABLET at 08:07

## 2021-12-11 RX ADMIN — SODIUM CHLORIDE: 9 INJECTION, SOLUTION INTRAVENOUS at 11:24

## 2021-12-11 RX ADMIN — Medication 10 ML: at 08:08

## 2021-12-11 RX ADMIN — LEVOTHYROXINE SODIUM 25 MCG: 25 TABLET ORAL at 06:00

## 2021-12-11 ASSESSMENT — PAIN SCALES - GENERAL: PAINLEVEL_OUTOF10: 0

## 2021-12-11 NOTE — PROGRESS NOTES
1815: Called nurse-to-nurse report to [address] [place] [address] at [facility].

## 2023-05-11 ENCOUNTER — APPOINTMENT (OUTPATIENT)
Dept: CT IMAGING | Age: 63
End: 2023-05-11
Payer: MEDICARE

## 2023-05-11 ENCOUNTER — HOSPITAL ENCOUNTER (EMERGENCY)
Age: 63
Discharge: HOME | End: 2023-05-12
Attending: EMERGENCY MEDICINE
Payer: MEDICARE

## 2023-05-11 VITALS
DIASTOLIC BLOOD PRESSURE: 74 MMHG | RESPIRATION RATE: 16 BRPM | HEART RATE: 79 BPM | HEIGHT: 64 IN | WEIGHT: 169 LBS | TEMPERATURE: 98.9 F | SYSTOLIC BLOOD PRESSURE: 126 MMHG | BODY MASS INDEX: 28.85 KG/M2 | OXYGEN SATURATION: 95 %

## 2023-05-11 DIAGNOSIS — L03.213 PRESEPTAL CELLULITIS OF RIGHT EYE: Primary | ICD-10-CM

## 2023-05-11 LAB
ANION GAP SERPL CALCULATED.3IONS-SCNC: 11 MMOL/L (ref 7–16)
BASOPHILS # BLD: 0.1 E9/L (ref 0–0.2)
BASOPHILS NFR BLD: 0.9 % (ref 0–2)
BUN SERPL-MCNC: 10 MG/DL (ref 6–23)
CALCIUM SERPL-MCNC: 9.6 MG/DL (ref 8.6–10.2)
CHLORIDE SERPL-SCNC: 102 MMOL/L (ref 98–107)
CO2 SERPL-SCNC: 23 MMOL/L (ref 22–29)
CREAT SERPL-MCNC: 0.9 MG/DL (ref 0.5–1)
EOSINOPHIL # BLD: 0.03 E9/L (ref 0.05–0.5)
EOSINOPHIL NFR BLD: 0.3 % (ref 0–6)
ERYTHROCYTE [DISTWIDTH] IN BLOOD BY AUTOMATED COUNT: 12.2 FL (ref 11.5–15)
GLUCOSE SERPL-MCNC: 110 MG/DL (ref 74–99)
HCT VFR BLD AUTO: 41.7 % (ref 34–48)
HGB BLD-MCNC: 13.8 G/DL (ref 11.5–15.5)
IMM GRANULOCYTES # BLD: 0.03 E9/L
IMM GRANULOCYTES NFR BLD: 0.3 % (ref 0–5)
LYMPHOCYTES # BLD: 3.89 E9/L (ref 1.5–4)
LYMPHOCYTES NFR BLD: 35.1 % (ref 20–42)
MCH RBC QN AUTO: 31.4 PG (ref 26–35)
MCHC RBC AUTO-ENTMCNC: 33.1 % (ref 32–34.5)
MCV RBC AUTO: 95 FL (ref 80–99.9)
MONOCYTES # BLD: 1.22 E9/L (ref 0.1–0.95)
MONOCYTES NFR BLD: 11 % (ref 2–12)
NEUTROPHILS # BLD: 5.8 E9/L (ref 1.8–7.3)
NEUTS SEG NFR BLD: 52.4 % (ref 43–80)
PLATELET # BLD AUTO: 201 E9/L (ref 130–450)
PMV BLD AUTO: 10.2 FL (ref 7–12)
POTASSIUM SERPL-SCNC: 4.2 MMOL/L (ref 3.5–5)
RBC # BLD AUTO: 4.39 E12/L (ref 3.5–5.5)
SODIUM SERPL-SCNC: 136 MMOL/L (ref 132–146)
WBC # BLD: 11.1 E9/L (ref 4.5–11.5)

## 2023-05-11 PROCEDURE — 70487 CT MAXILLOFACIAL W/DYE: CPT

## 2023-05-11 PROCEDURE — 80048 BASIC METABOLIC PNL TOTAL CA: CPT

## 2023-05-11 PROCEDURE — 85025 COMPLETE CBC W/AUTO DIFF WBC: CPT

## 2023-05-11 PROCEDURE — 6360000004 HC RX CONTRAST MEDICATION: Performed by: RADIOLOGY

## 2023-05-11 PROCEDURE — 99285 EMERGENCY DEPT VISIT HI MDM: CPT

## 2023-05-11 RX ORDER — GABAPENTIN 300 MG/1
300 CAPSULE ORAL 3 TIMES DAILY
COMMUNITY

## 2023-05-11 RX ORDER — MIRTAZAPINE 15 MG/1
15 TABLET, FILM COATED ORAL NIGHTLY
COMMUNITY

## 2023-05-11 RX ADMIN — IOPAMIDOL 75 ML: 755 INJECTION, SOLUTION INTRAVENOUS at 23:07

## 2023-05-11 ASSESSMENT — PAIN - FUNCTIONAL ASSESSMENT: PAIN_FUNCTIONAL_ASSESSMENT: NONE - DENIES PAIN

## 2023-05-12 RX ORDER — AMOXICILLIN AND CLAVULANATE POTASSIUM 875; 125 MG/1; MG/1
1 TABLET, FILM COATED ORAL 2 TIMES DAILY
Qty: 20 TABLET | Refills: 0 | Status: ON HOLD | OUTPATIENT
Start: 2023-05-12 | End: 2023-05-19 | Stop reason: HOSPADM

## 2023-05-12 ASSESSMENT — ENCOUNTER SYMPTOMS
SHORTNESS OF BREATH: 0
NAUSEA: 0
VOMITING: 0
ABDOMINAL PAIN: 0
EYE REDNESS: 0
FACIAL SWELLING: 1

## 2023-05-12 NOTE — ED PROVIDER NOTES
[address]        Pt Name: Patrice Garcia  MRN: [id number]  [place] 1960  Date of evaluation: 5/11/2023  Provider: Rush Barajas DO  PCP: [facility]  Note Started: 12:41 PM EDT 5/12/23    CHIEF COMPLAINT       Chief Complaint   Patient presents with    Facial Swelling     R sided facial swelling since this AM       HISTORY OF PRESENT ILLNESS: 1 or more Elements       Patrice Garcia is a 70 y.o. female who presents to the emergency department with a chief complaint of right-sided facial swelling. The history is obtained from the patient as well as the patient's medical record. The patient states that she began this morning with right-sided facial swelling. This is located mostly over the patient right maxilla. She does complain of mild itching sensation. She denies any pain with extraocular movements. She has never had any similar symptoms in the past.  She denies any blurred vision. She denies any fevers, chills, nausea or vomiting. Nursing Notes were all reviewed and agreed with or any disagreements were addressed in the HPI. REVIEW OF SYSTEMS :      Review of Systems   Constitutional:  Negative for chills and fatigue. HENT:  Positive for facial swelling (Located over the right maxilla and right periorbital region). Negative for congestion. Eyes:  Negative for redness. Respiratory:  Negative for shortness of breath. Cardiovascular:  Negative for chest pain. Gastrointestinal:  Negative for abdominal pain, nausea and vomiting. Genitourinary:  Negative for dysuria. Musculoskeletal:  Negative for arthralgias. Skin:  Negative for rash. Neurological:  Negative for light-headedness. Psychiatric/Behavioral:  Negative for confusion. All other systems reviewed and are negative. Positives and Pertinent negatives as per HPI.      SURGICAL HISTORY     Past Surgical History:   Procedure

## 2023-05-12 NOTE — ED NOTES
Reviewed discharge information. Patient verbalized understanding of paperwork, medication, and care instructions. Patient denied any questions.       Leigha Garcia RN  05/12/23 9169

## 2023-05-12 NOTE — ED NOTES
Report called to Chito Lockhart at [facility]. Updated on patient condition, decision to discharge, and transport ETA.      Sadie Chance, RN  05/12/23 MYLA Mars, RN  05/12/23 3582

## 2023-05-15 ENCOUNTER — HOSPITAL ENCOUNTER (EMERGENCY)
Age: 63
Discharge: HOME | DRG: 075 | End: 2023-05-15
Attending: EMERGENCY MEDICINE
Payer: MEDICARE

## 2023-05-15 ENCOUNTER — APPOINTMENT (OUTPATIENT)
Dept: GENERAL RADIOLOGY | Age: 63
DRG: 075 | End: 2023-05-15
Payer: MEDICARE

## 2023-05-15 VITALS
HEIGHT: 64 IN | DIASTOLIC BLOOD PRESSURE: 81 MMHG | SYSTOLIC BLOOD PRESSURE: 126 MMHG | RESPIRATION RATE: 20 BRPM | BODY MASS INDEX: 28.85 KG/M2 | TEMPERATURE: 98.2 F | HEART RATE: 97 BPM | OXYGEN SATURATION: 95 % | WEIGHT: 169 LBS

## 2023-05-15 DIAGNOSIS — R53.1 GENERALIZED WEAKNESS: Primary | ICD-10-CM

## 2023-05-15 LAB
ALBUMIN SERPL-MCNC: 4.4 G/DL (ref 3.5–5.2)
ALP SERPL-CCNC: 133 U/L (ref 35–104)
ALT SERPL-CCNC: 13 U/L (ref 0–32)
ANION GAP SERPL CALCULATED.3IONS-SCNC: 9 MMOL/L (ref 7–16)
APTT BLD: 28.5 SEC (ref 24.5–35.1)
AST SERPL-CCNC: 14 U/L (ref 0–31)
BACTERIA URNS QL MICRO: NORMAL /HPF
BASOPHILS # BLD: 0.07 E9/L (ref 0–0.2)
BASOPHILS NFR BLD: 0.6 % (ref 0–2)
BILIRUB SERPL-MCNC: 0.3 MG/DL (ref 0–1.2)
BILIRUB UR QL STRIP: NEGATIVE
BUN SERPL-MCNC: 12 MG/DL (ref 6–23)
CALCIUM SERPL-MCNC: 9.7 MG/DL (ref 8.6–10.2)
CHLORIDE SERPL-SCNC: 96 MMOL/L (ref 98–107)
CLARITY UR: CLEAR
CO2 SERPL-SCNC: 27 MMOL/L (ref 22–29)
COLOR UR: YELLOW
CREAT SERPL-MCNC: 0.9 MG/DL (ref 0.5–1)
EOSINOPHIL # BLD: 0.01 E9/L (ref 0.05–0.5)
EOSINOPHIL NFR BLD: 0.1 % (ref 0–6)
EPI CELLS #/AREA URNS HPF: NORMAL /HPF
ERYTHROCYTE [DISTWIDTH] IN BLOOD BY AUTOMATED COUNT: 11.9 FL (ref 11.5–15)
GLUCOSE SERPL-MCNC: 117 MG/DL (ref 74–99)
GLUCOSE UR STRIP-MCNC: NEGATIVE MG/DL
HCT VFR BLD AUTO: 44.2 % (ref 34–48)
HGB BLD-MCNC: 14.9 G/DL (ref 11.5–15.5)
HGB UR QL STRIP: NEGATIVE
IMM GRANULOCYTES # BLD: 0.06 E9/L
IMM GRANULOCYTES NFR BLD: 0.5 % (ref 0–5)
INR BLD: 1
KETONES UR STRIP-MCNC: NEGATIVE MG/DL
LACTATE BLDV-SCNC: 1.1 MMOL/L (ref 0.5–1.9)
LEUKOCYTE ESTERASE UR QL STRIP: ABNORMAL
LYMPHOCYTES # BLD: 3.29 E9/L (ref 1.5–4)
LYMPHOCYTES NFR BLD: 27 % (ref 20–42)
MCH RBC QN AUTO: 31.5 PG (ref 26–35)
MCHC RBC AUTO-ENTMCNC: 33.7 % (ref 32–34.5)
MCV RBC AUTO: 93.4 FL (ref 80–99.9)
MONOCYTES # BLD: 0.82 E9/L (ref 0.1–0.95)
MONOCYTES NFR BLD: 6.7 % (ref 2–12)
NEUTROPHILS # BLD: 7.93 E9/L (ref 1.8–7.3)
NEUTS SEG NFR BLD: 65.1 % (ref 43–80)
NITRITE UR QL STRIP: NEGATIVE
PH UR STRIP: 6.5 [PH] (ref 5–9)
PLATELET # BLD AUTO: 247 E9/L (ref 130–450)
PMV BLD AUTO: 10 FL (ref 7–12)
POTASSIUM SERPL-SCNC: 4.4 MMOL/L (ref 3.5–5)
PROT SERPL-MCNC: 7.9 G/DL (ref 6.4–8.3)
PROT UR STRIP-MCNC: NEGATIVE MG/DL
PROTHROMBIN TIME: 10.8 SEC (ref 9.3–12.4)
RBC # BLD AUTO: 4.73 E12/L (ref 3.5–5.5)
RBC #/AREA URNS HPF: NORMAL /HPF (ref 0–2)
SODIUM SERPL-SCNC: 132 MMOL/L (ref 132–146)
SP GR UR STRIP: <=1.005 (ref 1–1.03)
TROPONIN, HIGH SENSITIVITY: 6 NG/L (ref 0–9)
TSH SERPL-MCNC: 2.54 UIU/ML (ref 0.27–4.2)
UROBILINOGEN UR STRIP-ACNC: 0.2 E.U./DL
WBC # BLD: 12.2 E9/L (ref 4.5–11.5)
WBC #/AREA URNS HPF: NORMAL /HPF (ref 0–5)

## 2023-05-15 PROCEDURE — 85730 THROMBOPLASTIN TIME PARTIAL: CPT

## 2023-05-15 PROCEDURE — 2580000003 HC RX 258: Performed by: EMERGENCY MEDICINE

## 2023-05-15 PROCEDURE — 84484 ASSAY OF TROPONIN QUANT: CPT

## 2023-05-15 PROCEDURE — 80053 COMPREHEN METABOLIC PANEL: CPT

## 2023-05-15 PROCEDURE — 87040 BLOOD CULTURE FOR BACTERIA: CPT

## 2023-05-15 PROCEDURE — 84443 ASSAY THYROID STIM HORMONE: CPT

## 2023-05-15 PROCEDURE — 85610 PROTHROMBIN TIME: CPT

## 2023-05-15 PROCEDURE — 36415 COLL VENOUS BLD VENIPUNCTURE: CPT

## 2023-05-15 PROCEDURE — 93005 ELECTROCARDIOGRAM TRACING: CPT | Performed by: EMERGENCY MEDICINE

## 2023-05-15 PROCEDURE — 71045 X-RAY EXAM CHEST 1 VIEW: CPT

## 2023-05-15 PROCEDURE — 87088 URINE BACTERIA CULTURE: CPT

## 2023-05-15 PROCEDURE — 99285 EMERGENCY DEPT VISIT HI MDM: CPT

## 2023-05-15 PROCEDURE — 85025 COMPLETE CBC W/AUTO DIFF WBC: CPT

## 2023-05-15 PROCEDURE — 81001 URINALYSIS AUTO W/SCOPE: CPT

## 2023-05-15 PROCEDURE — 83605 ASSAY OF LACTIC ACID: CPT

## 2023-05-15 RX ORDER — 0.9 % SODIUM CHLORIDE 0.9 %
1000 INTRAVENOUS SOLUTION INTRAVENOUS ONCE
Status: COMPLETED | OUTPATIENT
Start: 2023-05-15 | End: 2023-05-15

## 2023-05-15 RX ADMIN — SODIUM CHLORIDE 1000 ML: 9 INJECTION, SOLUTION INTRAVENOUS at 14:25

## 2023-05-15 NOTE — CARE COORDINATION
Social Work/Transition of Care:     Pt in need of transportation back to her group home, SW called [facility] ETA 30 minutes.     Electronically signed by Gena Nair on 0/72/5025 at 6:31 PM

## 2023-05-15 NOTE — ED PROVIDER NOTES
/HPF    Bacteria, UA NONE SEEN None Seen /HPF   EKG 12 lead   Result Value Ref Range    Ventricular Rate 91 BPM    Atrial Rate 91 BPM    P-R Interval 142 ms    QRS Duration 74 ms    Q-T Interval 326 ms    QTc Calculation (Bazett) 400 ms    P Axis 69 degrees    R Axis 62 degrees    T Axis 62 degrees       RADIOLOGY:  Interpreted by Radiologist.  XR CHEST PORTABLE   Final Result   No acute cardiopulmonary disease.             ------------------------- NURSING NOTES AND VITALS REVIEWED ---------------------------   The nursing notes within the ED encounter and vital signs as below have been reviewed. /81   Pulse 97   Temp 98.2 °F (36.8 °C) (Oral)   Resp 20   Ht 5' 4\" (1.626 m)   Wt 169 lb (76.7 kg)   SpO2 95%   BMI 29.01 kg/m²   Oxygen Saturation Interpretation: Normal      ---------------------------------------------------PHYSICAL EXAM--------------------------------------      Constitutional/General: Alert and oriented x3, well appearing, non toxic in NAD  Head: NC/AT  Eyes: PERRL, EOMI  Nose:  Nares patent. No congestion or discharge noted. Ears:  TM's intact without erythema or perforation. External canal without swelling  Mouth: Oropharynx clear, handling secretions, no trismus  Neck: Supple, full ROM, no meningeal signs  Pulmonary: Lungs Clear to auscultation bilaterally. No rales or rhonchi or wheezes noted. No retractions. Cardiovascular:  Regular rate and rhythm, no murmurs, gallops, or rubs. 2+ symmetric distal pulses   Chest:  No tenderness, deformity or crepitus  Abdomen: Soft, non tender, non distended, normal bowel sounds  Back:  No tenderness to palpation on the cervical or thoracic or lumbar spine  Extremities: Moves all extremities x 4. Warm and well perfused  Skin: warm and dry. Side of the patient's face and her entire forehead has erythema which she states is a chronic condition.   Neurologic: GCS 15, no focal acute neurological deficit  Psych: Normal

## 2023-05-16 ENCOUNTER — HOSPITAL ENCOUNTER (INPATIENT)
Age: 63
LOS: 3 days | Discharge: OTHER INSTITUTION | DRG: 075 | End: 2023-05-19
Attending: EMERGENCY MEDICINE | Admitting: FAMILY MEDICINE
Payer: MEDICARE

## 2023-05-16 ENCOUNTER — APPOINTMENT (OUTPATIENT)
Dept: CT IMAGING | Age: 63
DRG: 075 | End: 2023-05-16
Payer: MEDICARE

## 2023-05-16 DIAGNOSIS — R41.0 DELIRIUM: Primary | ICD-10-CM

## 2023-05-16 DIAGNOSIS — R50.9 FEBRILE ILLNESS: ICD-10-CM

## 2023-05-16 PROBLEM — F20.0 PARANOID SCHIZOPHRENIA (HCC): Status: ACTIVE | Noted: 2023-05-16

## 2023-05-16 PROBLEM — I10 CHRONIC HYPERTENSION: Status: ACTIVE | Noted: 2023-05-16

## 2023-05-16 PROBLEM — G93.40 ACUTE ENCEPHALOPATHY: Status: ACTIVE | Noted: 2023-05-16

## 2023-05-16 PROBLEM — R41.82 ALTERED MENTAL STATUS: Status: ACTIVE | Noted: 2023-05-16

## 2023-05-16 LAB
ALBUMIN SERPL-MCNC: 4.2 G/DL (ref 3.5–5.2)
ALP SERPL-CCNC: 130 U/L (ref 35–104)
ALT SERPL-CCNC: 11 U/L (ref 0–32)
AMMONIA PLAS-SCNC: 21.3 UMOL/L (ref 11–51)
AMPHET UR QL SCN: NOT DETECTED
ANION GAP SERPL CALCULATED.3IONS-SCNC: 10 MMOL/L (ref 7–16)
AST SERPL-CCNC: 14 U/L (ref 0–31)
B PARAP IS1001 DNA NPH QL NAA+NON-PROBE: NOT DETECTED
B PERT.PT PRMT NPH QL NAA+NON-PROBE: NOT DETECTED
BARBITURATES UR QL SCN: NOT DETECTED
BASOPHILS # BLD: 0.1 E9/L (ref 0–0.2)
BASOPHILS NFR BLD: 0.8 % (ref 0–2)
BENZODIAZ UR QL SCN: POSITIVE
BILIRUB SERPL-MCNC: 0.5 MG/DL (ref 0–1.2)
BNP BLD-MCNC: 494 PG/ML (ref 0–125)
BUN SERPL-MCNC: 10 MG/DL (ref 6–23)
C PNEUM DNA NPH QL NAA+NON-PROBE: NOT DETECTED
CALCIUM SERPL-MCNC: 9.3 MG/DL (ref 8.6–10.2)
CANNABINOIDS UR QL SCN: NOT DETECTED
CHLORIDE SERPL-SCNC: 99 MMOL/L (ref 98–107)
CO2 SERPL-SCNC: 25 MMOL/L (ref 22–29)
COCAINE UR QL SCN: NOT DETECTED
CREAT SERPL-MCNC: 0.9 MG/DL (ref 0.5–1)
CRP SERPL HS-MCNC: <0.3 MG/DL (ref 0–0.4)
DRUG SCREEN COMMENT UR-IMP: ABNORMAL
EKG ATRIAL RATE: 115 BPM
EKG ATRIAL RATE: 91 BPM
EKG P AXIS: 69 DEGREES
EKG P AXIS: 73 DEGREES
EKG P-R INTERVAL: 142 MS
EKG P-R INTERVAL: 158 MS
EKG Q-T INTERVAL: 306 MS
EKG Q-T INTERVAL: 326 MS
EKG QRS DURATION: 70 MS
EKG QRS DURATION: 74 MS
EKG QTC CALCULATION (BAZETT): 400 MS
EKG QTC CALCULATION (BAZETT): 423 MS
EKG R AXIS: 62 DEGREES
EKG R AXIS: 70 DEGREES
EKG T AXIS: 54 DEGREES
EKG T AXIS: 62 DEGREES
EKG VENTRICULAR RATE: 115 BPM
EKG VENTRICULAR RATE: 91 BPM
EOSINOPHIL # BLD: 0.01 E9/L (ref 0.05–0.5)
EOSINOPHIL NFR BLD: 0.1 % (ref 0–6)
ERYTHROCYTE [DISTWIDTH] IN BLOOD BY AUTOMATED COUNT: 12 FL (ref 11.5–15)
ERYTHROCYTE [SEDIMENTATION RATE] IN BLOOD BY WESTERGREN METHOD: 13 MM/HR (ref 0–20)
FENTANYL SCREEN, URINE: NOT DETECTED
FLUAV RNA NPH QL NAA+NON-PROBE: NOT DETECTED
FLUBV RNA NPH QL NAA+NON-PROBE: NOT DETECTED
GLUCOSE SERPL-MCNC: 108 MG/DL (ref 74–99)
HADV DNA NPH QL NAA+NON-PROBE: NOT DETECTED
HCOV 229E RNA NPH QL NAA+NON-PROBE: NOT DETECTED
HCOV HKU1 RNA NPH QL NAA+NON-PROBE: NOT DETECTED
HCOV NL63 RNA NPH QL NAA+NON-PROBE: NOT DETECTED
HCOV OC43 RNA NPH QL NAA+NON-PROBE: NOT DETECTED
HCT VFR BLD AUTO: 45.1 % (ref 34–48)
HGB BLD-MCNC: 15.1 G/DL (ref 11.5–15.5)
HMPV RNA NPH QL NAA+NON-PROBE: NOT DETECTED
HPIV1 RNA NPH QL NAA+NON-PROBE: NOT DETECTED
HPIV2 RNA NPH QL NAA+NON-PROBE: NOT DETECTED
HPIV3 RNA NPH QL NAA+NON-PROBE: NOT DETECTED
HPIV4 RNA NPH QL NAA+NON-PROBE: NOT DETECTED
IMM GRANULOCYTES # BLD: 0.03 E9/L
IMM GRANULOCYTES NFR BLD: 0.3 % (ref 0–5)
INFLUENZA A BY PCR: NOT DETECTED
INFLUENZA B BY PCR: NOT DETECTED
LACTATE BLDV-SCNC: 0.5 MMOL/L (ref 0.5–2.2)
LACTATE BLDV-SCNC: 1 MMOL/L (ref 0.5–1.9)
LYMPHOCYTES # BLD: 3.96 E9/L (ref 1.5–4)
LYMPHOCYTES NFR BLD: 33.6 % (ref 20–42)
M PNEUMO DNA NPH QL NAA+NON-PROBE: NOT DETECTED
MCH RBC QN AUTO: 31.3 PG (ref 26–35)
MCHC RBC AUTO-ENTMCNC: 33.5 % (ref 32–34.5)
MCV RBC AUTO: 93.4 FL (ref 80–99.9)
METHADONE UR QL SCN: NOT DETECTED
MONOCYTES # BLD: 1.2 E9/L (ref 0.1–0.95)
MONOCYTES NFR BLD: 10.2 % (ref 2–12)
NEUTROPHILS # BLD: 6.47 E9/L (ref 1.8–7.3)
NEUTS SEG NFR BLD: 55 % (ref 43–80)
OPIATES UR QL SCN: NOT DETECTED
OXYCODONE URINE: NOT DETECTED
PCP UR QL SCN: NOT DETECTED
PLATELET # BLD AUTO: 235 E9/L (ref 130–450)
PMV BLD AUTO: 9.9 FL (ref 7–12)
POTASSIUM SERPL-SCNC: 4.1 MMOL/L (ref 3.5–5)
PROCALCITONIN: 0.03 NG/ML (ref 0–0.08)
PROT SERPL-MCNC: 7.8 G/DL (ref 6.4–8.3)
RBC # BLD AUTO: 4.83 E12/L (ref 3.5–5.5)
RSV RNA NPH QL NAA+NON-PROBE: NOT DETECTED
RV+EV RNA NPH QL NAA+NON-PROBE: NOT DETECTED
SARS-COV-2 RDRP RESP QL NAA+PROBE: NOT DETECTED
SARS-COV-2 RNA NPH QL NAA+NON-PROBE: NOT DETECTED
SODIUM SERPL-SCNC: 134 MMOL/L (ref 132–146)
WBC # BLD: 11.8 E9/L (ref 4.5–11.5)

## 2023-05-16 PROCEDURE — 99285 EMERGENCY DEPT VISIT HI MDM: CPT

## 2023-05-16 PROCEDURE — 2580000003 HC RX 258: Performed by: EMERGENCY MEDICINE

## 2023-05-16 PROCEDURE — 0202U NFCT DS 22 TRGT SARS-COV-2: CPT

## 2023-05-16 PROCEDURE — 62270 DX LMBR SPI PNXR: CPT

## 2023-05-16 PROCEDURE — 6370000000 HC RX 637 (ALT 250 FOR IP)

## 2023-05-16 PROCEDURE — 80053 COMPREHEN METABOLIC PANEL: CPT

## 2023-05-16 PROCEDURE — 2580000003 HC RX 258: Performed by: INTERNAL MEDICINE

## 2023-05-16 PROCEDURE — 87635 SARS-COV-2 COVID-19 AMP PRB: CPT

## 2023-05-16 PROCEDURE — 87150 DNA/RNA AMPLIFIED PROBE: CPT

## 2023-05-16 PROCEDURE — 86140 C-REACTIVE PROTEIN: CPT

## 2023-05-16 PROCEDURE — 2060000000 HC ICU INTERMEDIATE R&B

## 2023-05-16 PROCEDURE — 87040 BLOOD CULTURE FOR BACTERIA: CPT

## 2023-05-16 PROCEDURE — 93005 ELECTROCARDIOGRAM TRACING: CPT | Performed by: EMERGENCY MEDICINE

## 2023-05-16 PROCEDURE — 85025 COMPLETE CBC W/AUTO DIFF WBC: CPT

## 2023-05-16 PROCEDURE — 36415 COLL VENOUS BLD VENIPUNCTURE: CPT

## 2023-05-16 PROCEDURE — 2500000003 HC RX 250 WO HCPCS

## 2023-05-16 PROCEDURE — 93010 ELECTROCARDIOGRAM REPORT: CPT | Performed by: INTERNAL MEDICINE

## 2023-05-16 PROCEDURE — 6360000002 HC RX W HCPCS: Performed by: EMERGENCY MEDICINE

## 2023-05-16 PROCEDURE — 009U3ZX DRAINAGE OF SPINAL CANAL, PERCUTANEOUS APPROACH, DIAGNOSTIC: ICD-10-PCS | Performed by: EMERGENCY MEDICINE

## 2023-05-16 PROCEDURE — 96374 THER/PROPH/DIAG INJ IV PUSH: CPT

## 2023-05-16 PROCEDURE — 89051 BODY FLUID CELL COUNT: CPT

## 2023-05-16 PROCEDURE — 6360000002 HC RX W HCPCS: Performed by: INTERNAL MEDICINE

## 2023-05-16 PROCEDURE — 83880 ASSAY OF NATRIURETIC PEPTIDE: CPT

## 2023-05-16 PROCEDURE — 87502 INFLUENZA DNA AMP PROBE: CPT

## 2023-05-16 PROCEDURE — 70450 CT HEAD/BRAIN W/O DYE: CPT

## 2023-05-16 PROCEDURE — 71250 CT THORAX DX C-: CPT

## 2023-05-16 PROCEDURE — 83605 ASSAY OF LACTIC ACID: CPT

## 2023-05-16 PROCEDURE — 85651 RBC SED RATE NONAUTOMATED: CPT

## 2023-05-16 PROCEDURE — 74176 CT ABD & PELVIS W/O CONTRAST: CPT

## 2023-05-16 PROCEDURE — 82140 ASSAY OF AMMONIA: CPT

## 2023-05-16 PROCEDURE — 80307 DRUG TEST PRSMV CHEM ANLYZR: CPT

## 2023-05-16 PROCEDURE — 99223 1ST HOSP IP/OBS HIGH 75: CPT | Performed by: INTERNAL MEDICINE

## 2023-05-16 PROCEDURE — 84145 PROCALCITONIN (PCT): CPT

## 2023-05-16 RX ORDER — SODIUM CHLORIDE 0.9 % (FLUSH) 0.9 %
5-40 SYRINGE (ML) INJECTION PRN
Status: DISCONTINUED | OUTPATIENT
Start: 2023-05-16 | End: 2023-05-19 | Stop reason: HOSPADM

## 2023-05-16 RX ORDER — 0.9 % SODIUM CHLORIDE 0.9 %
1000 INTRAVENOUS SOLUTION INTRAVENOUS ONCE
Status: COMPLETED | OUTPATIENT
Start: 2023-05-16 | End: 2023-05-16

## 2023-05-16 RX ORDER — ACETAMINOPHEN 325 MG/1
TABLET ORAL
Status: COMPLETED
Start: 2023-05-16 | End: 2023-05-16

## 2023-05-16 RX ORDER — LEVETIRACETAM 250 MG/1
250 TABLET ORAL 2 TIMES DAILY
Status: ON HOLD | COMMUNITY
End: 2023-05-19 | Stop reason: HOSPADM

## 2023-05-16 RX ORDER — ONDANSETRON 2 MG/ML
4 INJECTION INTRAMUSCULAR; INTRAVENOUS EVERY 6 HOURS PRN
Status: DISCONTINUED | OUTPATIENT
Start: 2023-05-16 | End: 2023-05-19 | Stop reason: HOSPADM

## 2023-05-16 RX ORDER — DEXAMETHASONE SODIUM PHOSPHATE 10 MG/ML
10 INJECTION INTRAMUSCULAR; INTRAVENOUS ONCE
Status: COMPLETED | OUTPATIENT
Start: 2023-05-16 | End: 2023-05-16

## 2023-05-16 RX ORDER — ONDANSETRON 4 MG/1
4 TABLET, ORALLY DISINTEGRATING ORAL EVERY 8 HOURS PRN
Status: DISCONTINUED | OUTPATIENT
Start: 2023-05-16 | End: 2023-05-19 | Stop reason: HOSPADM

## 2023-05-16 RX ORDER — MIDAZOLAM HYDROCHLORIDE 2 MG/2ML
2 INJECTION, SOLUTION INTRAMUSCULAR; INTRAVENOUS ONCE
Status: COMPLETED | OUTPATIENT
Start: 2023-05-16 | End: 2023-05-16

## 2023-05-16 RX ORDER — OLANZAPINE 10 MG/1
10 TABLET ORAL NIGHTLY
Status: DISCONTINUED | OUTPATIENT
Start: 2023-05-16 | End: 2023-05-19 | Stop reason: HOSPADM

## 2023-05-16 RX ORDER — SODIUM CHLORIDE 0.9 % (FLUSH) 0.9 %
5-40 SYRINGE (ML) INJECTION EVERY 12 HOURS SCHEDULED
Status: DISCONTINUED | OUTPATIENT
Start: 2023-05-16 | End: 2023-05-19 | Stop reason: HOSPADM

## 2023-05-16 RX ORDER — DIAPER,BRIEF,INFANT-TODD,DISP
EACH MISCELLANEOUS 2 TIMES DAILY
COMMUNITY

## 2023-05-16 RX ORDER — DEXAMETHASONE SODIUM PHOSPHATE 4 MG/ML
4 INJECTION, SOLUTION INTRA-ARTICULAR; INTRALESIONAL; INTRAMUSCULAR; INTRAVENOUS; SOFT TISSUE EVERY 6 HOURS
Status: DISCONTINUED | OUTPATIENT
Start: 2023-05-16 | End: 2023-05-18

## 2023-05-16 RX ORDER — ACETAMINOPHEN 650 MG/1
650 SUPPOSITORY RECTAL EVERY 6 HOURS PRN
Status: DISCONTINUED | OUTPATIENT
Start: 2023-05-16 | End: 2023-05-19 | Stop reason: HOSPADM

## 2023-05-16 RX ORDER — LATANOPROST 50 UG/ML
1 SOLUTION/ DROPS OPHTHALMIC NIGHTLY
COMMUNITY

## 2023-05-16 RX ORDER — MIRTAZAPINE 15 MG/1
15 TABLET, FILM COATED ORAL NIGHTLY
Status: DISCONTINUED | OUTPATIENT
Start: 2023-05-16 | End: 2023-05-19 | Stop reason: HOSPADM

## 2023-05-16 RX ORDER — GABAPENTIN 300 MG/1
300 CAPSULE ORAL 3 TIMES DAILY
Status: DISCONTINUED | OUTPATIENT
Start: 2023-05-16 | End: 2023-05-19 | Stop reason: HOSPADM

## 2023-05-16 RX ORDER — SODIUM CHLORIDE 9 MG/ML
INJECTION, SOLUTION INTRAVENOUS ONCE
Status: COMPLETED | OUTPATIENT
Start: 2023-05-16 | End: 2023-05-16

## 2023-05-16 RX ORDER — ACETAMINOPHEN 500 MG
500-1000 TABLET ORAL EVERY 6 HOURS PRN
COMMUNITY

## 2023-05-16 RX ORDER — ACETAMINOPHEN 325 MG/1
650 TABLET ORAL EVERY 6 HOURS PRN
Status: DISCONTINUED | OUTPATIENT
Start: 2023-05-16 | End: 2023-05-19 | Stop reason: HOSPADM

## 2023-05-16 RX ORDER — RISPERIDONE 0.5 MG/1
0.5 TABLET ORAL 2 TIMES DAILY
COMMUNITY

## 2023-05-16 RX ORDER — CETIRIZINE HYDROCHLORIDE 10 MG/1
10 TABLET ORAL NIGHTLY
Status: DISCONTINUED | OUTPATIENT
Start: 2023-05-16 | End: 2023-05-19 | Stop reason: HOSPADM

## 2023-05-16 RX ORDER — LEVETIRACETAM 250 MG/1
250 TABLET ORAL 2 TIMES DAILY
Status: DISCONTINUED | OUTPATIENT
Start: 2023-05-16 | End: 2023-05-19 | Stop reason: HOSPADM

## 2023-05-16 RX ORDER — ATORVASTATIN CALCIUM 20 MG/1
20 TABLET, FILM COATED ORAL NIGHTLY
Status: DISCONTINUED | OUTPATIENT
Start: 2023-05-16 | End: 2023-05-19 | Stop reason: HOSPADM

## 2023-05-16 RX ORDER — RISPERIDONE 0.5 MG/1
0.5 TABLET ORAL 2 TIMES DAILY
Status: DISCONTINUED | OUTPATIENT
Start: 2023-05-16 | End: 2023-05-19 | Stop reason: HOSPADM

## 2023-05-16 RX ORDER — PANTOPRAZOLE SODIUM 40 MG/1
40 TABLET, DELAYED RELEASE ORAL
Status: DISCONTINUED | OUTPATIENT
Start: 2023-05-17 | End: 2023-05-19 | Stop reason: HOSPADM

## 2023-05-16 RX ORDER — LEVOTHYROXINE SODIUM 0.03 MG/1
25 TABLET ORAL EVERY MORNING
Status: DISCONTINUED | OUTPATIENT
Start: 2023-05-16 | End: 2023-05-19 | Stop reason: HOSPADM

## 2023-05-16 RX ORDER — LIDOCAINE HYDROCHLORIDE 10 MG/ML
INJECTION, SOLUTION INFILTRATION; PERINEURAL
Status: COMPLETED
Start: 2023-05-16 | End: 2023-05-16

## 2023-05-16 RX ORDER — LISINOPRIL 20 MG/1
40 TABLET ORAL EVERY MORNING
Status: DISCONTINUED | OUTPATIENT
Start: 2023-05-16 | End: 2023-05-19 | Stop reason: HOSPADM

## 2023-05-16 RX ORDER — POLYETHYLENE GLYCOL 3350 17 G/17G
17 POWDER, FOR SOLUTION ORAL DAILY PRN
Status: DISCONTINUED | OUTPATIENT
Start: 2023-05-16 | End: 2023-05-19 | Stop reason: HOSPADM

## 2023-05-16 RX ORDER — LATANOPROST 50 UG/ML
1 SOLUTION/ DROPS OPHTHALMIC NIGHTLY
Status: DISCONTINUED | OUTPATIENT
Start: 2023-05-16 | End: 2023-05-19 | Stop reason: HOSPADM

## 2023-05-16 RX ORDER — SODIUM CHLORIDE 9 MG/ML
INJECTION, SOLUTION INTRAVENOUS PRN
Status: DISCONTINUED | OUTPATIENT
Start: 2023-05-16 | End: 2023-05-19 | Stop reason: HOSPADM

## 2023-05-16 RX ORDER — OLANZAPINE 10 MG/1
10 TABLET ORAL NIGHTLY
COMMUNITY

## 2023-05-16 RX ORDER — PROPRANOLOL HYDROCHLORIDE 20 MG/1
20 TABLET ORAL EVERY MORNING
Status: DISCONTINUED | OUTPATIENT
Start: 2023-05-16 | End: 2023-05-19 | Stop reason: HOSPADM

## 2023-05-16 RX ADMIN — LIDOCAINE HYDROCHLORIDE: 10 INJECTION, SOLUTION INFILTRATION; PERINEURAL at 13:36

## 2023-05-16 RX ADMIN — DEXAMETHASONE SODIUM PHOSPHATE 4 MG: 4 INJECTION, SOLUTION INTRAMUSCULAR; INTRAVENOUS at 19:37

## 2023-05-16 RX ADMIN — AMPICILLIN SODIUM 2000 MG: 2 INJECTION, POWDER, FOR SOLUTION INTRAVENOUS at 16:03

## 2023-05-16 RX ADMIN — ACETAMINOPHEN 650 MG: 325 TABLET ORAL at 13:42

## 2023-05-16 RX ADMIN — CEFTRIAXONE 1000 MG: 1 INJECTION, POWDER, FOR SOLUTION INTRAMUSCULAR; INTRAVENOUS at 10:08

## 2023-05-16 RX ADMIN — SODIUM CHLORIDE: 9 INJECTION, SOLUTION INTRAVENOUS at 08:33

## 2023-05-16 RX ADMIN — CEFTRIAXONE SODIUM 1000 MG: 1 INJECTION, POWDER, FOR SOLUTION INTRAMUSCULAR; INTRAVENOUS at 13:45

## 2023-05-16 RX ADMIN — Medication 10 ML: at 21:30

## 2023-05-16 RX ADMIN — VANCOMYCIN HYDROCHLORIDE 1500 MG: 10 INJECTION, POWDER, LYOPHILIZED, FOR SOLUTION INTRAVENOUS at 13:43

## 2023-05-16 RX ADMIN — MIDAZOLAM 2 MG: 1 INJECTION INTRAMUSCULAR; INTRAVENOUS at 13:37

## 2023-05-16 RX ADMIN — SODIUM CHLORIDE 1000 ML: 9 INJECTION, SOLUTION INTRAVENOUS at 10:08

## 2023-05-16 RX ADMIN — DEXAMETHASONE SODIUM PHOSPHATE 10 MG: 10 INJECTION INTRAMUSCULAR; INTRAVENOUS at 13:42

## 2023-05-16 ASSESSMENT — PAIN - FUNCTIONAL ASSESSMENT: PAIN_FUNCTIONAL_ASSESSMENT: NONE - DENIES PAIN

## 2023-05-16 NOTE — PROGRESS NOTES
Telephone consent obtained from patient's sister Ozzie Mccrary for lumbar puncture tomorrow and verified with another RN. Placed in soft chart.

## 2023-05-16 NOTE — PLAN OF CARE
Problem: Skin/Tissue Integrity  Goal: Absence of new skin breakdown  Description: 1. Monitor for areas of redness and/or skin breakdown  2. Assess vascular access sites hourly  3. Every 4-6 hours minimum:  Change oxygen saturation probe site  4. Every 4-6 hours:  If on nasal continuous positive airway pressure, respiratory therapy assess nares and determine need for appliance change or resting period. 5/16/2023 1933 by Prince Mora RN  Outcome: Progressing  5/16/2023 1537 by Richi Hough RN  Outcome: Progressing     Problem: Discharge Planning  Goal: Discharge to home or other facility with appropriate resources  Outcome: Progressing     Problem: Confusion  Goal: Confusion, delirium, dementia, or psychosis is improved or at baseline  Description: INTERVENTIONS:  1. Assess for possible contributors to thought disturbance, including medications, impaired vision or hearing, underlying metabolic abnormalities, dehydration, psychiatric diagnoses, and notify attending LIP  2. [place] high risk fall precautions, as indicated  3. Provide frequent short contacts to provide reality reorientation, refocusing and direction  4. Decrease environmental stimuli, including noise as appropriate  5. Monitor and intervene to maintain adequate nutrition, hydration, elimination, sleep and activity  6. If unable to ensure safety without constant attention obtain sitter and review sitter guidelines with assigned personnel  7.  Initiate Psychosocial CNS and Spiritual Care consult, as indicated  Outcome: Progressing     Problem: Safety - Adult  Goal: Free from fall injury  5/16/2023 1933 by Prince Mora RN  Outcome: Progressing  5/16/2023 1537 by Richi Hough RN  Outcome: Progressing

## 2023-05-16 NOTE — ED PROVIDER NOTES
HPI:  5/16/23,   Time: 9:14 AM EDT         Desmond Andrews is a 70 y.o. female presenting to the ED for weakness altered mental status and a low-grade fever, beginning day ago. The complaint has been persistent, moderate in severity, and worsened by nothing. Patient was seen here yesterday had a essentially negative evaluation and was discharged back to the group home. Patient returns today because she is profoundly weak and more confused than normal.  Patient states she has some discomfort in her abdomen but is a poor historian with an altered mental status    ROS:   Positive abdominal pain negative headache negative lower extremity pain  --------------------------------------------- PAST HISTORY ---------------------------------------------  Past Medical History:  has a past medical history of HTN (hypertension), Paranoid schizophrenia ([facility] [address] and Smoking 1/2 pack a day or less. Past Surgical History:  has a past surgical history that includes Tubal ligation (Bilateral). Social History:  reports that she has been smoking cigarettes. She has a 25.00 pack-year smoking history. She has never used smokeless tobacco. She reports that she does not use drugs. Family History: family history is not on file. The patients home medications have been reviewed. Allergies: Patient has no known allergies.     -------------------------------------------------- RESULTS -------------------------------------------------  All laboratory and radiology results have been personally reviewed by myself   LABS:  Results for orders placed or performed during the hospital encounter of 05/16/23   COVID-19, Rapid    Specimen: Nasopharyngeal Swab   Result Value Ref Range    SARS-CoV-2, NAAT Not Detected Not Detected   RAPID INFLUENZA A/B ANTIGENS    Specimen: Nasopharyngeal   Result Value Ref Range    Influenza A by PCR Not Detected Not Detected    Influenza B by PCR Not Detected Not Detected   Lactate, Sepsis   Result Value Ref

## 2023-05-16 NOTE — ED NOTES
ED to Inpatient Handoff Report    Notified Emma Lara that electronic handoff available and patient ready for transport to room 420. Safety Risks: Difficulty with daily activities and Risk of falls    Patient in Restraints: no    Constant Observer or Patient : no    Telemetry Monitoring Ordered :Yes      Cardiac Rhythm: Sinus tachy    Order to transfer to unit without monitor:N/A    Last MEWS: 2 Time completed: 1337    Vitals:    05/16/23 1027 05/16/23 1157 05/16/23 1215 05/16/23 1337   BP: 131/86   (!) 134/119   Pulse: (!) 108   (!) 112   Resp: 25   18   Temp:  99.6 °F (37.6 °C) (!) 101.6 °F (38.7 °C)    TempSrc:  Oral Rectal    SpO2: 93%   95%       Opportunity for questions and clarification was provided.         Makenna Mccloud RN  05/16/23 2696

## 2023-05-16 NOTE — PROGRESS NOTES
Database initiated tot he best of my ability using facility paperwork and chart history. Patient alert x O she is unable to answer questions at this time. She come in from [facility] [place]. Pharmacy tech to verify home medications.

## 2023-05-16 NOTE — PROGRESS NOTES
Per Joseph Lopez, patient's nurse; May 22nd is to be patient's last dose of Keppra; she is being weaned off of it for behavioral purposes.

## 2023-05-17 ENCOUNTER — APPOINTMENT (OUTPATIENT)
Dept: GENERAL RADIOLOGY | Age: 63
DRG: 075 | End: 2023-05-17
Payer: MEDICARE

## 2023-05-17 LAB
ALBUMIN SERPL-MCNC: 3.8 G/DL (ref 3.5–5.2)
ALP SERPL-CCNC: 112 U/L (ref 35–104)
ALT SERPL-CCNC: 10 U/L (ref 0–32)
ANION GAP SERPL CALCULATED.3IONS-SCNC: 11 MMOL/L (ref 7–16)
APPEARANCE CSF: CLEAR
AST SERPL-CCNC: 13 U/L (ref 0–31)
BACTERIA UR CULT: NORMAL
BILIRUB SERPL-MCNC: 0.5 MG/DL (ref 0–1.2)
BUN SERPL-MCNC: 12 MG/DL (ref 6–23)
C GATTII+NEOFOR DNA CSF QL NAA+NON-PROBE: NOT DETECTED
CALCIUM SERPL-MCNC: 8.8 MG/DL (ref 8.6–10.2)
CHLORIDE SERPL-SCNC: 104 MMOL/L (ref 98–107)
CMV DNA CSF QL NAA+NON-PROBE: NOT DETECTED
CO2 SERPL-SCNC: 20 MMOL/L (ref 22–29)
COLOR CSF: COLORLESS
CREAT SERPL-MCNC: 0.7 MG/DL (ref 0.5–1)
E COLI K1 DNA CSF QL NAA+NON-PROBE: NOT DETECTED
ERYTHROCYTE [DISTWIDTH] IN BLOOD BY AUTOMATED COUNT: 11.8 FL (ref 11.5–15)
EV RNA CSF QL NAA+NON-PROBE: NOT DETECTED
GLUCOSE CSF-MCNC: 57 MG/DL (ref 40–70)
GLUCOSE SERPL-MCNC: 106 MG/DL (ref 74–99)
GP B STREP DNA CSF QL NAA+NON-PROBE: NOT DETECTED
HAEM INFLU DNA CSF QL NAA+NON-PROBE: NOT DETECTED
HCT VFR BLD AUTO: 42 % (ref 34–48)
HGB BLD-MCNC: 13.6 G/DL (ref 11.5–15.5)
HHV6 DNA CSF QL NAA+NON-PROBE: NOT DETECTED
HSV1 DNA CSF QL NAA+NON-PROBE: NOT DETECTED
HSV2 DNA CSF QL NAA+NON-PROBE: NOT DETECTED
L MONOCYTOG DNA CSF QL NAA+NON-PROBE: NOT DETECTED
MCH RBC QN AUTO: 30.6 PG (ref 26–35)
MCHC RBC AUTO-ENTMCNC: 32.4 % (ref 32–34.5)
MCV RBC AUTO: 94.6 FL (ref 80–99.9)
MONOCYTE, CSF: 100 % (ref 10–70)
N MEN DNA CSF QL NAA+NON-PROBE: NOT DETECTED
NEUTROPHILS, CSF: 0 % (ref 0–10)
PARECHOVIRUS A RNA CSF QL NAA+NON-PROBE: NOT DETECTED
PLATELET # BLD AUTO: 221 E9/L (ref 130–450)
PMV BLD AUTO: 9.8 FL (ref 7–12)
POTASSIUM SERPL-SCNC: 4.5 MMOL/L (ref 3.5–5)
PROT CSF-MCNC: 191 MG/DL (ref 15–40)
PROT SERPL-MCNC: 7 G/DL (ref 6.4–8.3)
RBC # BLD AUTO: 4.44 E12/L (ref 3.5–5.5)
RBC CSF: <2000 /UL
S PNEUM DNA CSF QL NAA+NON-PROBE: NOT DETECTED
SODIUM SERPL-SCNC: 135 MMOL/L (ref 132–146)
TUBE NUMBER CSF: ABNORMAL
VZV DNA CSF QL NAA+NON-PROBE: NOT DETECTED
WBC # BLD: 10.4 E9/L (ref 4.5–11.5)
WBC CSF: 32 /UL (ref 0–2)

## 2023-05-17 PROCEDURE — 87205 SMEAR GRAM STAIN: CPT

## 2023-05-17 PROCEDURE — 2580000003 HC RX 258: Performed by: INTERNAL MEDICINE

## 2023-05-17 PROCEDURE — 36415 COLL VENOUS BLD VENIPUNCTURE: CPT

## 2023-05-17 PROCEDURE — 80053 COMPREHEN METABOLIC PANEL: CPT

## 2023-05-17 PROCEDURE — 6370000000 HC RX 637 (ALT 250 FOR IP): Performed by: INTERNAL MEDICINE

## 2023-05-17 PROCEDURE — 99233 SBSQ HOSP IP/OBS HIGH 50: CPT | Performed by: INTERNAL MEDICINE

## 2023-05-17 PROCEDURE — 82945 GLUCOSE OTHER FLUID: CPT

## 2023-05-17 PROCEDURE — 84157 ASSAY OF PROTEIN OTHER: CPT

## 2023-05-17 PROCEDURE — 2500000003 HC RX 250 WO HCPCS: Performed by: PHYSICIAN ASSISTANT

## 2023-05-17 PROCEDURE — 2060000000 HC ICU INTERMEDIATE R&B

## 2023-05-17 PROCEDURE — 87483 CNS DNA AMP PROBE TYPE 12-25: CPT

## 2023-05-17 PROCEDURE — 85027 COMPLETE CBC AUTOMATED: CPT

## 2023-05-17 PROCEDURE — 6360000002 HC RX W HCPCS: Performed by: INTERNAL MEDICINE

## 2023-05-17 PROCEDURE — 62328 DX LMBR SPI PNXR W/FLUOR/CT: CPT

## 2023-05-17 PROCEDURE — 87070 CULTURE OTHR SPECIMN AEROBIC: CPT

## 2023-05-17 RX ORDER — LIDOCAINE HYDROCHLORIDE 10 MG/ML
INJECTION, SOLUTION INFILTRATION; PERINEURAL PRN
Status: COMPLETED | OUTPATIENT
Start: 2023-05-17 | End: 2023-05-17

## 2023-05-17 RX ADMIN — CETIRIZINE HYDROCHLORIDE 10 MG: 10 TABLET, FILM COATED ORAL at 20:51

## 2023-05-17 RX ADMIN — Medication 10 ML: at 20:51

## 2023-05-17 RX ADMIN — GABAPENTIN 300 MG: 300 CAPSULE ORAL at 13:38

## 2023-05-17 RX ADMIN — GABAPENTIN 300 MG: 300 CAPSULE ORAL at 08:26

## 2023-05-17 RX ADMIN — LEVOTHYROXINE SODIUM 25 MCG: 25 TABLET ORAL at 08:26

## 2023-05-17 RX ADMIN — LEVETIRACETAM 250 MG: 250 TABLET, FILM COATED ORAL at 08:26

## 2023-05-17 RX ADMIN — DEXAMETHASONE SODIUM PHOSPHATE 4 MG: 4 INJECTION, SOLUTION INTRAMUSCULAR; INTRAVENOUS at 13:39

## 2023-05-17 RX ADMIN — MIRTAZAPINE 15 MG: 15 TABLET, FILM COATED ORAL at 20:51

## 2023-05-17 RX ADMIN — DEXAMETHASONE SODIUM PHOSPHATE 4 MG: 4 INJECTION, SOLUTION INTRAMUSCULAR; INTRAVENOUS at 20:51

## 2023-05-17 RX ADMIN — Medication 10 ML: at 08:30

## 2023-05-17 RX ADMIN — RISPERIDONE 0.5 MG: 0.5 TABLET ORAL at 08:26

## 2023-05-17 RX ADMIN — LISINOPRIL 40 MG: 20 TABLET ORAL at 08:26

## 2023-05-17 RX ADMIN — OLANZAPINE 10 MG: 10 TABLET, FILM COATED ORAL at 20:51

## 2023-05-17 RX ADMIN — PROPRANOLOL HYDROCHLORIDE 20 MG: 20 TABLET ORAL at 08:26

## 2023-05-17 RX ADMIN — RISPERIDONE 0.5 MG: 0.5 TABLET ORAL at 20:51

## 2023-05-17 RX ADMIN — LIDOCAINE HYDROCHLORIDE 5 ML: 10 INJECTION, SOLUTION INFILTRATION; PERINEURAL at 11:35

## 2023-05-17 RX ADMIN — GABAPENTIN 300 MG: 300 CAPSULE ORAL at 20:51

## 2023-05-17 RX ADMIN — DEXAMETHASONE SODIUM PHOSPHATE 4 MG: 4 INJECTION, SOLUTION INTRAMUSCULAR; INTRAVENOUS at 02:14

## 2023-05-17 RX ADMIN — ATORVASTATIN CALCIUM 20 MG: 20 TABLET, FILM COATED ORAL at 20:51

## 2023-05-17 RX ADMIN — DEXAMETHASONE SODIUM PHOSPHATE 4 MG: 4 INJECTION, SOLUTION INTRAMUSCULAR; INTRAVENOUS at 08:24

## 2023-05-17 RX ADMIN — LATANOPROST 1 DROP: 50 SOLUTION OPHTHALMIC at 20:52

## 2023-05-17 RX ADMIN — LEVETIRACETAM 250 MG: 250 TABLET, FILM COATED ORAL at 20:51

## 2023-05-17 ASSESSMENT — PAIN SCALES - GENERAL
PAINLEVEL_OUTOF10: 0

## 2023-05-17 NOTE — ACP (ADVANCE CARE PLANNING)
Advance Care Planning   Healthcare Decision Maker:    Primary Decision Maker: Mely Elizabeth - Brother/Sister - [phone]      Today we documented Decision Maker(s) consistent with Legal Next of Kin hierarchy.

## 2023-05-17 NOTE — PROGRESS NOTES
[facility] Progress Note    --------------------------------------------------------------------------------------  Assessment / Plan  Acute cryptogenic encephalopathy, setting of paranoid schizophrenia  R/o sepsis / infectious picture; meets temp and HR criteria. Blood and urine cx sent. WBC, UA not c/w infection. Flu / [place] neg. CT chest, abdomen, pelvis non-contributory. S/p LP 5/16, follow results. Procal, ESR, CRP unremarkable. Got Rocephin, ampicillin, vancomycin. S/p dose of Decadron, would continue. Consult to ID for add'l recs. Doubt toxic encephalopathy but technically possible; regimen from custodial includes Keppra, gabapentin, Ativan, mirtazipine, olanzapine, and risperidone but none appear to be new. Resume and monitor. UDS with +BDZ only which pt is prescribed  Not hypoxemic / hypercarbic  Doubt cerebrovascular; CTH ok, can consider MRI but feel we can defer for now  Doubt hepatic; LFTs ok except for some elevation in alk phos. Can check ammonia to r/o but no hx of hepatopathy to cause this. Would also make NPO for now and have bedside swallow prior to letting pt eat to help safeguard against aspiration     Hx HTN    Anticipated disposition  Group home  Anticipated needs on discharge None  Anticipated length of stay  To be determined  Code status    Full Code  DVT prophylaxis     SCDs  --------------------------------------------------------------------------------------    Admission Date  5/16/2023  8:17 AM  Chief Complaint AMS    Subjective  History of Present Illness  This patient is a 26-year-old female with past medical history pertinent for paranoid schizophrenia as well as hypertension and tobacco abuse who resides at [facility] home in [place] and who was transported here over concerns of altered mental status. On my interview in the ER, patient did not verbally answer any questions that were asked of her.   She opened her eyes and looked at me and shook her

## 2023-05-17 NOTE — PROGRESS NOTES
Comprehensive Nutrition Assessment    Type and Reason for Visit:  Initial, Positive Nutrition Screen    Nutrition Recommendations/Plan:   Continue current diet and ONS, as tolerated  Continue inpatient monitoring     Malnutrition Assessment:  Malnutrition Status: At risk for malnutrition (Comment) (05/17/23 1340)    Context:  Acute Illness     Findings of the 6 clinical characteristics of malnutrition:  Energy Intake:  50% or less of estimated energy requirements for 5 or more days  Weight Loss:  Unable to assess     Body Fat Loss:  No significant body fat loss     Muscle Mass Loss:  No significant muscle mass loss    Fluid Accumulation:  No significant fluid accumulation     Strength:  Not Performed    Nutrition Assessment:    Pt admit from group home 2/2 acute febrile illness and AMS. Currently s/p 5/17 LP to eval for meningitis. PMH=paranoid schizophrenia, recent facial edema/cellulitis. Currently on a Regular diet. Will add ONS to all meals to optimize nutrient intake in the setting of AMS likely adversely affecting intake. Nutrition Related Findings:    Disoriented x 4, no edema, I/O WNL, Wound Type: None (LP site)       Current Nutrition Intake & Therapies:    Average Meal Intake: 0%  Average Supplements Intake: None Ordered  ADULT DIET; Regular  ADULT ORAL NUTRITION SUPPLEMENT; Breakfast, Dinner; Standard High Calorie/High Protein Oral Supplement    Anthropometric Measures:  Height: 5' 4\" (162.6 cm)  Ideal Body Weight (IBW): 120 lbs (55 kg)    Admission Body Weight: 188 lb 12.8 oz (85.6 kg) (5/16)  Current Body Weight: 182 lb 9.6 oz (82.8 kg), 152.2 % IBW. Weight Source: Bed Scale (5/17)  Current BMI (kg/m2): 31.3  Usual Body Weight:  (no actual wt hx <12 mo available)                       BMI Categories: Obese Class 1 (BMI 30.0-34. 9)    Estimated Daily Nutrient Needs:  Energy Requirements Based On: Formula ([address])  Weight Used for Energy Requirements: Current  Energy (kcal/day):

## 2023-05-17 NOTE — BRIEF OP NOTE
Diagnosis: AMS    Procedure: Image Guided Lumbar Puncture    Findings: Successful intrathecal needle placement with clear CSF return      Specimen: 11.5cc of clear CSF obtained    EBL: Minimal    Complication: None immediately post procedure.     Plan: 3 hrs bedrest in patients room

## 2023-05-17 NOTE — OR NURSING
Patient arrival from floor to radiology for lumbar puncture. Vitals taken, consent signed by sister, and  [place] PA-C in to speak with the patient about the procedure. Patient placed prone on Fluoroscopy table, patient prepped and draped. Using fluoroscopy imaging, needle placed to lower back by LAQUITA Worthy . 11.5 ML clear, colorless spinal fluid was obtained. Samples taken and sent to lab. Needle removed, site cleansed, and band aid applied to site. Patient placed on cart and transported back to room. Nurse to nurse called.

## 2023-05-17 NOTE — CONSULTS
[address] [place] [address] [facility]  Consultation Note     Admit Date: 5/16/2023  8:17 AM    Reason for Consult:   AMS. Fever/tachycardia but unclear source. ER thinks CNS. Culture sent. ESR/CRP/procalcitonin ordered    Attending Physician:  Christiano Cabrera DO    HISTORY OF PRESENT ILLNESS:             The history is obtained from extensive review of available past medical records. The patient is a 70 y.o. female who is unknown to the ID service. The patient presented to the ED at [facility] on 5/15/2023 with generalized weakness. She was discharged home. She was taken back to the ED at [facility] on 5/16/2023 with the same symptoms and a low-grade fever. She was slightly tachycardic but afebrile. A rapid SARS-CoV-2 and influenza were negative. Respiratory panel negative. Toxicology screen was positive for benzodiazepines. Lactic acid was normal.  White count was slightly elevated. She was treated with Ceftriaxone, Vancomycin and Ampicillin as well as dexamethasone. A lumbar puncture was attempted on multiple occasions. IR was consulted to do a lumbar puncture but they could not do it until this morning. 11.5 mL of clear, colorless fluid was obtained.     Past Medical History:        Diagnosis Date    HTN (hypertension)     Paranoid schizophrenia ([facility] [address]     Cant remember when diagnosed    Smoking 1/2 pack a day or less      Past Surgical History:        Procedure Laterality Date    TUBAL LIGATION Bilateral      Current Medications:   Scheduled Meds:   propranolol  20 mg Oral QAM    levothyroxine  25 mcg Oral QAM    lisinopril  40 mg Oral QAM    atorvastatin  20 mg Oral Nightly    cetirizine  10 mg Oral Nightly    gabapentin  300 mg Oral TID    mirtazapine  15 mg Oral Nightly    latanoprost  1 drop Both Eyes Nightly    OLANZapine  10 mg Oral Nightly    risperiDONE  0.5 mg Oral BID    levETIRAcetam  250 mg Oral BID    pantoprazole  40 mg Oral QAM AC    sodium chloride flush

## 2023-05-17 NOTE — PLAN OF CARE
No retinal holes or tears seen on exam. Recommended OBSERVATION. We reviewed the signs and symptoms of retinal tear/retinal detachment and the importance of prompt evaluation should there be increasing floaters, new flashing lights, or decreasing peripheral vision in either eye at any time. Patient understands condition, prognosis and need for follow up care. Problem: Skin/Tissue Integrity  Goal: Absence of new skin breakdown  Description: 1. Monitor for areas of redness and/or skin breakdown  2. Assess vascular access sites hourly  3. Every 4-6 hours minimum:  Change oxygen saturation probe site  4. Every 4-6 hours:  If on nasal continuous positive airway pressure, respiratory therapy assess nares and determine need for appliance change or resting period.   Outcome: Progressing     Problem: Safety - Adult  Goal: Free from fall injury  Outcome: Progressing     Problem: Pain  Goal: Verbalizes/displays adequate comfort level or baseline comfort level  Outcome: Progressing

## 2023-05-17 NOTE — PROGRESS NOTES
Occupational Therapy  OT consult received to eval/treat and chart review complete. Patient on bedrest following lumbar puncture. OT to re-attempt at a later date/time as able and appropriate.      [place] Born, OTR/L  License Number: [id number]

## 2023-05-17 NOTE — CARE COORDINATION
Social work / Discharge Planning:         Patient resides at [facility] home [phone]. Social work spoke to the [facility] son. Patient normally ambulates independently or with a walker. The facility feels that patient needs PARMJIT prior to return to group home because her current needs outweigh the level of care provided in that setting. Patient does not have a legal guardian so her sister will need to be her decision maker. Awaiting PT/OT evaluations to confirm that patient will qualify for PARMJIT. Per IDR, patient is currently on IV Decadron and plan is for IR  lumbar puncture today. Case Management Assessment  Initial Evaluation    Date/Time of Evaluation: 5/17/2023 9:31 AM  Assessment Completed by: MIKE Olvera    If patient is discharged prior to next notation, then this note serves as note for discharge by case management. Patient Name: Tao Mann                   YOB: 1960  Diagnosis: Delirium [R41.0]  Altered mental status [R41.82]  Febrile illness [R50.9]                   Date / Time: 5/16/2023  8:17 AM    Patient Admission Status: Inpatient   Readmission Risk (Low < 19, Mod (19-27), High > 27): Readmission Risk Score: 15    Current PCP: [facility]  PCP verified by CM? Yes    Chart Reviewed: Yes      History Provided by: Other (see comment) (staff at [facility])  Patient Orientation: Unable to Assess    Patient Cognition: Alert    Hospitalization in the last 30 days (Readmission):  No    If yes, Readmission Assessment in CM Navigator will be completed.     Advance Directives:      Code Status: Full Code   Patient's Primary Decision Maker is: Legal Next of Kin    Primary Decision MakerTommi Traci Castillo Brother/Sister - [phone]    Discharge Planning:    Patient lives with: Friends Type of Home: Group Home  Primary Care Giver: Self  Patient Support Systems include: Friends/Neighbors, Home Care Staff   Current Financial

## 2023-05-18 LAB
A BAUMANNII DNA BLD POS QL NAA+NON-PROBE: NOT DETECTED
ALBUMIN SERPL-MCNC: 4 G/DL (ref 3.5–5.2)
ALP SERPL-CCNC: 110 U/L (ref 35–104)
ALT SERPL-CCNC: 9 U/L (ref 0–32)
ANION GAP SERPL CALCULATED.3IONS-SCNC: 11 MMOL/L (ref 7–16)
AST SERPL-CCNC: 11 U/L (ref 0–31)
BILIRUB SERPL-MCNC: 0.5 MG/DL (ref 0–1.2)
BOTTLE TYPE: ABNORMAL
BUN SERPL-MCNC: 18 MG/DL (ref 6–23)
C ALBICANS DNA BLD POS QL NAA+NON-PROBE: NOT DETECTED
C AURIS DNA BLD POS QL NAA+PROBE: NOT DETECTED
C GLABRATA DNA BLD POS QL NAA+NON-PROBE: NOT DETECTED
C KRUSEI DNA BLD POS QL NAA+NON-PROBE: NOT DETECTED
C PARAP DNA BLD POS QL NAA+NON-PROBE: NOT DETECTED
C TROPICLS DNA BLD POS QL NAA+NON-PROBE: NOT DETECTED
CALCIUM SERPL-MCNC: 9.4 MG/DL (ref 8.6–10.2)
CHLORIDE SERPL-SCNC: 101 MMOL/L (ref 98–107)
CO2 SERPL-SCNC: 22 MMOL/L (ref 22–29)
CREAT SERPL-MCNC: 0.7 MG/DL (ref 0.5–1)
CRYPTOCOCCUS NEOFORMANS/GATTII BY PCR: NOT DETECTED
E CLOAC COMP DNA BLD POS NAA+NON-PROBE: NOT DETECTED
E COLI DNA BLD POS QL NAA+NON-PROBE: NOT DETECTED
E FAECALIS DNA BLD POS QL NAA+PROBE: NOT DETECTED
E FAECIUM DNA BLD POS QL NAA+PROBE: NOT DETECTED
ENTEROBACT DNA BLD POS QL NAA+NON-PROBE: NOT DETECTED
ENTEROCOC DNA BLD POS QL NAA+NON-PROBE: NOT DETECTED
ERYTHROCYTE [DISTWIDTH] IN BLOOD BY AUTOMATED COUNT: 11.9 FL (ref 11.5–15)
GLUCOSE SERPL-MCNC: 117 MG/DL (ref 74–99)
GN BLD CULTURE PNL BLD POS NAA+PROBE: NOT DETECTED
HCT VFR BLD AUTO: 41.6 % (ref 34–48)
HGB BLD-MCNC: 13.8 G/DL (ref 11.5–15.5)
K OXYTOCA DNA BLD POS QL NAA+NON-PROBE: NOT DETECTED
K PNEUMON DNA SPEC QL NAA+PROBE: NOT DETECTED
K. AEROGENES DNA SPEC QL NAA+PROBE: NOT DETECTED
L MONOCYTOG DNA BLD POS QL NAA+NON-PROBE: NOT DETECTED
MCH RBC QN AUTO: 30.9 PG (ref 26–35)
MCHC RBC AUTO-ENTMCNC: 33.2 % (ref 32–34.5)
MCV RBC AUTO: 93.1 FL (ref 80–99.9)
N MEN DNA BLD POS QL NAA+NON-PROBE: NOT DETECTED
ORDER NUMBER: ABNORMAL
P AERUGINOSA DNA BLD POS NAA+NON-PROBE: NOT DETECTED
PLATELET # BLD AUTO: 236 E9/L (ref 130–450)
PMV BLD AUTO: 9.9 FL (ref 7–12)
POTASSIUM SERPL-SCNC: 4.4 MMOL/L (ref 3.5–5)
PROT SERPL-MCNC: 7.5 G/DL (ref 6.4–8.3)
PROTEUS SP DNA BLD POS QL NAA+NON-PROBE: NOT DETECTED
RBC # BLD AUTO: 4.47 E12/L (ref 3.5–5.5)
S AUREUS DNA BLD POS QL NAA+NON-PROBE: NOT DETECTED
S AUREUS+CONS DNA BLD POS NAA+NON-PROBE: DETECTED
S EPIDERMIDIS DNA BLD POS QL NAA+PROBE: NOT DETECTED
S LUGDUNENSIS DNA BLD POS QL NAA+PROBE: NOT DETECTED
S MALTOPH DNA BLD POS QL NAA+PROBE: NOT DETECTED
S MARCESCENS DNA BLD POS NAA+NON-PROBE: NOT DETECTED
S PNEUM DNA BLD POS QL NAA+NON-PROBE: NOT DETECTED
S PYO DNA BLD POS QL NAA+NON-PROBE: NOT DETECTED
SALMONELLA DNA BLD POS QL NAA+PROBE: NOT DETECTED
SODIUM SERPL-SCNC: 134 MMOL/L (ref 132–146)
SOURCE OF BLOOD CULTURE: ABNORMAL
STREPTOCOCCUS AGALACTIAE BY PCR: NOT DETECTED
STREPTOCOCCUS DNA BLD POS NAA+NON-PROBE: NOT DETECTED
WBC # BLD: 10 E9/L (ref 4.5–11.5)

## 2023-05-18 PROCEDURE — 97165 OT EVAL LOW COMPLEX 30 MIN: CPT

## 2023-05-18 PROCEDURE — 2060000000 HC ICU INTERMEDIATE R&B

## 2023-05-18 PROCEDURE — 2580000003 HC RX 258: Performed by: INTERNAL MEDICINE

## 2023-05-18 PROCEDURE — 6370000000 HC RX 637 (ALT 250 FOR IP): Performed by: INTERNAL MEDICINE

## 2023-05-18 PROCEDURE — 80053 COMPREHEN METABOLIC PANEL: CPT

## 2023-05-18 PROCEDURE — 85027 COMPLETE CBC AUTOMATED: CPT

## 2023-05-18 PROCEDURE — 99233 SBSQ HOSP IP/OBS HIGH 50: CPT | Performed by: INTERNAL MEDICINE

## 2023-05-18 PROCEDURE — 97161 PT EVAL LOW COMPLEX 20 MIN: CPT

## 2023-05-18 PROCEDURE — 6360000002 HC RX W HCPCS: Performed by: INTERNAL MEDICINE

## 2023-05-18 PROCEDURE — 36415 COLL VENOUS BLD VENIPUNCTURE: CPT

## 2023-05-18 RX ADMIN — LEVETIRACETAM 250 MG: 250 TABLET, FILM COATED ORAL at 21:10

## 2023-05-18 RX ADMIN — Medication 10 ML: at 21:10

## 2023-05-18 RX ADMIN — PROPRANOLOL HYDROCHLORIDE 20 MG: 20 TABLET ORAL at 09:14

## 2023-05-18 RX ADMIN — LISINOPRIL 40 MG: 20 TABLET ORAL at 09:13

## 2023-05-18 RX ADMIN — GABAPENTIN 300 MG: 300 CAPSULE ORAL at 09:13

## 2023-05-18 RX ADMIN — DEXAMETHASONE SODIUM PHOSPHATE 4 MG: 4 INJECTION, SOLUTION INTRAMUSCULAR; INTRAVENOUS at 09:14

## 2023-05-18 RX ADMIN — LEVOTHYROXINE SODIUM 25 MCG: 25 TABLET ORAL at 09:13

## 2023-05-18 RX ADMIN — Medication 10 ML: at 09:14

## 2023-05-18 RX ADMIN — RISPERIDONE 0.5 MG: 0.5 TABLET ORAL at 21:10

## 2023-05-18 RX ADMIN — RISPERIDONE 0.5 MG: 0.5 TABLET ORAL at 09:13

## 2023-05-18 RX ADMIN — MIRTAZAPINE 15 MG: 15 TABLET, FILM COATED ORAL at 21:10

## 2023-05-18 RX ADMIN — LATANOPROST 1 DROP: 50 SOLUTION OPHTHALMIC at 21:10

## 2023-05-18 RX ADMIN — PANTOPRAZOLE SODIUM 40 MG: 40 TABLET, DELAYED RELEASE ORAL at 05:23

## 2023-05-18 RX ADMIN — ATORVASTATIN CALCIUM 20 MG: 20 TABLET, FILM COATED ORAL at 21:10

## 2023-05-18 RX ADMIN — DEXAMETHASONE SODIUM PHOSPHATE 4 MG: 4 INJECTION, SOLUTION INTRAMUSCULAR; INTRAVENOUS at 02:59

## 2023-05-18 RX ADMIN — CETIRIZINE HYDROCHLORIDE 10 MG: 10 TABLET, FILM COATED ORAL at 21:10

## 2023-05-18 RX ADMIN — LEVETIRACETAM 250 MG: 250 TABLET, FILM COATED ORAL at 09:14

## 2023-05-18 RX ADMIN — OLANZAPINE 10 MG: 10 TABLET, FILM COATED ORAL at 21:10

## 2023-05-18 RX ADMIN — GABAPENTIN 300 MG: 300 CAPSULE ORAL at 21:10

## 2023-05-18 RX ADMIN — GABAPENTIN 300 MG: 300 CAPSULE ORAL at 15:03

## 2023-05-18 ASSESSMENT — PAIN SCALES - GENERAL
PAINLEVEL_OUTOF10: 0

## 2023-05-18 NOTE — PROGRESS NOTES
Occupational Therapy  OCCUPATIONAL THERAPY INITIAL EVALUATION  [facility] [address]    Date: 2023     Patient Name: Jessica Crowder  MRN: [id number]  : 1960  Room: [address] [place] [address]    Evaluating OT: Ilsa Corona. Christiano Beltre OTR/SONIDO - [id number]    Referring Provider: Esteban Catherine DO  Specific Provider Orders/Date: \"OT eval and treat\" - 2023    Diagnosis: Delirium [R41.0]  Altered mental status [R41.82]  Febrile illness [R50.9]     Pertinent Medical History: paranoid schizophrenia, HTN     Precautions: fall risk, bed/chair alarms, skin integrity    Assessment of Current Deficits:    [x] Functional mobility   [x] ADLs  [x] Strength               [x] Cognition   [x] Functional transfers   [x] IADLs         [x] Safety Awareness   [x] Endurance   [] Fine Motor Coordination  [x] Balance      [] Vision/Perception   [x] Sensation    [] Gross Motor Coordination [] ROM          [] Delirium                  [] Motor Control     OT PLAN OF CARE   OT POC is based on physician orders, patient diagnosis, and results of clinical assessment.   Frequency/Duration 2-5 days/week for 2 weeks PRN   Specific OT Treatment Interventions to Include:   * Instruction/training on adapted ADL techniques and AE recommendations to increase functional independence within precautions       * Training on energy conservation strategies, correct breathing pattern and techniques to improve independence/tolerance for self-care routine  * Functional transfer/mobility training/DME recommendations for increased independence, safety, and fall prevention  * Patient/Family education to increase follow through with safety techniques and functional independence  * Recommendation of environmental modifications for increased safety with functional transfers/mobility and ADLs  * Cognitive retraining/development of therapeutic activities to improve problem solving, judgement, memory,

## 2023-05-18 NOTE — PROGRESS NOTES
Physical Therapy  Facility/Department: [address] INTERNAL MEDICINE 2  Physical Therapy Initial Assessment    Name: Deo Denis  : 1960  MRN: [id number]  Date of Service: 2023        Patient Diagnosis(es): The primary encounter diagnosis was Delirium. A diagnosis of Febrile illness was also pertinent to this visit. Past Medical History:  has a past medical history of HTN (hypertension), Paranoid schizophrenia ([address] and Smoking 1/2 pack a day or less. Past Surgical History:  has a past surgical history that includes Tubal ligation (Bilateral). Evaluating Therapist: Janet Orr PT    Room #:  1327/2126-N  Diagnosis:  Delirium [R41.0]  Altered mental status [R41.82]  Febrile illness [R50.9]  PMHx/PSHx:  paranoid schizophrenia, HTN  Precautions:  falls, alarm    Social:  Pt lives in a group home. Ambulates with ww. Initial Evaluation  Date: 23 Treatment      Short Term/ Long Term   Goals   Was pt agreeable to Eval/treatment? yes     Does pt have pain? No c/o pain     Bed Mobility  Rolling: SBA  Supine to sit: SBA  Sit to supine: NT  Scooting: SBA  independent   Transfers Sit to stand: min assist  Stand to sit: min assist  Stand pivot: min assist  SBA   Ambulation    20 feet with ww with min assist. Assist to guide walker  100 feet with ww with SBA   Stair Negotiation  Ascended and descended  NT      LE strength     3+/5    4/5   balance      fair     AM-PAC Raw score               16/24         Pt is alert some confusion noted  LE ROM: WFL  Sensation: intact  Endurance: fair  Chair alarm: yes     ASSESSMENT:    Pt displays functional ability as noted in the objective portion of this evaluation. Patient education  Pt educated on safety with transfers    Patient response to education:   Pt verbalized understanding Pt demonstrated skill Pt requires further education in this area   yes With cues and assist yes       Comments:  Cues for safety with transfers and ambulation.  Assist to guide

## 2023-05-18 NOTE — PROGRESS NOTES
[facility] Progress Note    Admitting Date and Time: 5/16/2023  8:17 AM  Admit Dx: Delirium [R41.0]  Altered mental status [R41.82]  Febrile illness [R50.9]    Subjective:  Patient is being followed for Delirium [R41.0]  Altered mental status [R41.82]  Febrile illness [R50.9]   Pt was seen and examined at bedside this morning. Patient is awake, alert, following commands. No acute distress. She denies fever, chills, shortness of breath, chest pain, no abdominal pain. Denies headache, blurred vision, dizziness. No issues urinating. ROS: denies fever, chills, cp, sob, n/v, HA unless stated above.       propranolol  20 mg Oral QAM    levothyroxine  25 mcg Oral QAM    lisinopril  40 mg Oral QAM    atorvastatin  20 mg Oral Nightly    cetirizine  10 mg Oral Nightly    gabapentin  300 mg Oral TID    mirtazapine  15 mg Oral Nightly    latanoprost  1 drop Both Eyes Nightly    OLANZapine  10 mg Oral Nightly    risperiDONE  0.5 mg Oral BID    levETIRAcetam  250 mg Oral BID    pantoprazole  40 mg Oral QAM AC    sodium chloride flush  5-40 mL IntraVENous 2 times per day     sodium chloride flush, 5-40 mL, PRN  sodium chloride, , PRN  ondansetron, 4 mg, Q8H PRN   Or  ondansetron, 4 mg, Q6H PRN  polyethylene glycol, 17 g, Daily PRN  acetaminophen, 650 mg, Q6H PRN   Or  acetaminophen, 650 mg, Q6H PRN         Objective:    BP (!) 97/57   Pulse 81   Temp 97.8 °F (36.6 °C) (Temporal)   Resp 18   Ht 5' 4\" (1.626 m)   Wt 181 lb 3 oz (82.2 kg)   SpO2 94%   BMI 31.10 kg/m²     General Appearance: alert and oriented to person, place and time and in no acute distress  Skin: warm and dry  Head: normocephalic and atraumatic  Eyes: pupils equal, round, and reactive to light, extraocular eye movements intact, conjunctivae normal  Neck: neck supple and non tender without mass   Pulmonary/Chest: clear to auscultation bilaterally- no wheezes, rales or rhonchi, normal air movement, no respiratory

## 2023-05-18 NOTE — PROGRESS NOTES
[address] [place] [address] [facility]  Progress Note    SUBJECTIVE:  Chief Complaint   Patient presents with    Altered Mental Status     Here yesterday for same and discharged home. Increasing confusion since     Patient is feeling better. She remains afebrile. No nausea, vomiting or diarrhea. Appetite is good. Denies any pain. No dyspnea. Review of systems:  As stated above in the chief complaint, otherwise negative. Medications:  Scheduled Meds:   propranolol  20 mg Oral QAM    levothyroxine  25 mcg Oral QAM    lisinopril  40 mg Oral QAM    atorvastatin  20 mg Oral Nightly    cetirizine  10 mg Oral Nightly    gabapentin  300 mg Oral TID    mirtazapine  15 mg Oral Nightly    latanoprost  1 drop Both Eyes Nightly    OLANZapine  10 mg Oral Nightly    risperiDONE  0.5 mg Oral BID    levETIRAcetam  250 mg Oral BID    pantoprazole  40 mg Oral QAM AC    sodium chloride flush  5-40 mL IntraVENous 2 times per day     Continuous Infusions:   sodium chloride       PRN Meds:sodium chloride flush, sodium chloride, ondansetron **OR** ondansetron, polyethylene glycol, acetaminophen **OR** acetaminophen    OBJECTIVE:  /74   Pulse 83   Temp (!) 96.4 °F (35.8 °C) (Temporal)   Resp 18   Ht 5' 4\" (1.626 m)   Wt 181 lb 3 oz (82.2 kg)   SpO2 94%   BMI 31.10 kg/m²   Temp  Av.4 °F (36.3 °C)  Min: 96.4 °F (35.8 °C)  Max: 97.7 °F (36.5 °C)  Constitutional: The patient is sitting up in a chair. She is awake and alert. No distress. Skin: Warm and dry. No rashes were noted. HEENT: Round and reactive pupils. Moist mucous membranes. No ulcerations or thrush. Neck: Supple to movements. Chest: No use of accessory muscles to breathe. Symmetrical expansion. No wheezing, crackles or rhonchi. Cardiovascular: S1 and S2 are rhythmic and regular. No murmurs appreciated. Abdomen: Positive bowel sounds to auscultation. Benign to palpation. Extremities: No edema.   Lines: Peripheral.    Laboratory

## 2023-05-18 NOTE — PLAN OF CARE
Problem: Skin/Tissue Integrity  Goal: Absence of new skin breakdown  Description: 1. Monitor for areas of redness and/or skin breakdown  2. Assess vascular access sites hourly  3. Every 4-6 hours minimum:  Change oxygen saturation probe site  4. Every 4-6 hours:  If on nasal continuous positive airway pressure, respiratory therapy assess nares and determine need for appliance change or resting period. 5/18/2023 0038 by July Rubio  Outcome: Progressing  5/17/2023 1245 by Anup Cody RN  Outcome: Progressing     Problem: Discharge Planning  Goal: Discharge to home or other facility with appropriate resources  Outcome: Progressing  Flowsheets (Taken 5/18/2023 0038)  Discharge to home or other facility with appropriate resources:   Identify barriers to discharge with patient and caregiver   Identify discharge learning needs (meds, wound care, etc)   Refer to discharge planning if patient needs post-hospital services based on physician order or complex needs related to functional status, cognitive ability or social support system   Arrange for needed discharge resources and transportation as appropriate     Problem: Confusion  Goal: Confusion, delirium, dementia, or psychosis is improved or at baseline  Description: INTERVENTIONS:  1. Assess for possible contributors to thought disturbance, including medications, impaired vision or hearing, underlying metabolic abnormalities, dehydration, psychiatric diagnoses, and notify attending LIP  2. [place] high risk fall precautions, as indicated  3. Provide frequent short contacts to provide reality reorientation, refocusing and direction  4. Decrease environmental stimuli, including noise as appropriate  5. Monitor and intervene to maintain adequate nutrition, hydration, elimination, sleep and activity  6. If unable to ensure safety without constant attention obtain sitter and review sitter guidelines with assigned personnel  7.  Initiate Psychosocial CNS and

## 2023-05-19 VITALS
OXYGEN SATURATION: 94 % | HEIGHT: 64 IN | BODY MASS INDEX: 30.93 KG/M2 | DIASTOLIC BLOOD PRESSURE: 56 MMHG | TEMPERATURE: 97.7 F | RESPIRATION RATE: 16 BRPM | WEIGHT: 181.19 LBS | HEART RATE: 88 BPM | SYSTOLIC BLOOD PRESSURE: 88 MMHG

## 2023-05-19 LAB
ALBUMIN SERPL-MCNC: 3.6 G/DL (ref 3.5–5.2)
ALP SERPL-CCNC: 92 U/L (ref 35–104)
ALT SERPL-CCNC: 9 U/L (ref 0–32)
ANION GAP SERPL CALCULATED.3IONS-SCNC: 9 MMOL/L (ref 7–16)
AST SERPL-CCNC: 9 U/L (ref 0–31)
BACTERIA BLD CULT ORG #2: ABNORMAL
BACTERIA CSF CULT: NORMAL
BILIRUB SERPL-MCNC: 1.5 MG/DL (ref 0–1.2)
BUN SERPL-MCNC: 31 MG/DL (ref 6–23)
CALCIUM SERPL-MCNC: 9 MG/DL (ref 8.6–10.2)
CHLORIDE SERPL-SCNC: 104 MMOL/L (ref 98–107)
CO2 SERPL-SCNC: 23 MMOL/L (ref 22–29)
CREAT SERPL-MCNC: 0.9 MG/DL (ref 0.5–1)
ERYTHROCYTE [DISTWIDTH] IN BLOOD BY AUTOMATED COUNT: 12.2 FL (ref 11.5–15)
GLUCOSE SERPL-MCNC: 92 MG/DL (ref 74–99)
GRAM STN SPEC: NORMAL
HCT VFR BLD AUTO: 42.7 % (ref 34–48)
HGB BLD-MCNC: 14 G/DL (ref 11.5–15.5)
MCH RBC QN AUTO: 31.4 PG (ref 26–35)
MCHC RBC AUTO-ENTMCNC: 32.8 % (ref 32–34.5)
MCV RBC AUTO: 95.7 FL (ref 80–99.9)
ORGANISM: ABNORMAL
PLATELET # BLD AUTO: 233 E9/L (ref 130–450)
PMV BLD AUTO: 9.9 FL (ref 7–12)
POTASSIUM SERPL-SCNC: 4.2 MMOL/L (ref 3.5–5)
PROT SERPL-MCNC: 6.8 G/DL (ref 6.4–8.3)
RBC # BLD AUTO: 4.46 E12/L (ref 3.5–5.5)
SODIUM SERPL-SCNC: 136 MMOL/L (ref 132–146)
WBC # BLD: 11.9 E9/L (ref 4.5–11.5)

## 2023-05-19 PROCEDURE — 2580000003 HC RX 258: Performed by: INTERNAL MEDICINE

## 2023-05-19 PROCEDURE — 36415 COLL VENOUS BLD VENIPUNCTURE: CPT

## 2023-05-19 PROCEDURE — 6370000000 HC RX 637 (ALT 250 FOR IP): Performed by: INTERNAL MEDICINE

## 2023-05-19 PROCEDURE — 99239 HOSP IP/OBS DSCHRG MGMT >30: CPT | Performed by: INTERNAL MEDICINE

## 2023-05-19 PROCEDURE — 85027 COMPLETE CBC AUTOMATED: CPT

## 2023-05-19 PROCEDURE — 80053 COMPREHEN METABOLIC PANEL: CPT

## 2023-05-19 PROCEDURE — 97530 THERAPEUTIC ACTIVITIES: CPT

## 2023-05-19 RX ADMIN — RISPERIDONE 0.5 MG: 0.5 TABLET ORAL at 08:19

## 2023-05-19 RX ADMIN — GABAPENTIN 300 MG: 300 CAPSULE ORAL at 08:19

## 2023-05-19 RX ADMIN — GABAPENTIN 300 MG: 300 CAPSULE ORAL at 13:15

## 2023-05-19 RX ADMIN — LEVETIRACETAM 250 MG: 250 TABLET, FILM COATED ORAL at 08:19

## 2023-05-19 RX ADMIN — PROPRANOLOL HYDROCHLORIDE 20 MG: 20 TABLET ORAL at 08:19

## 2023-05-19 RX ADMIN — Medication 10 ML: at 08:20

## 2023-05-19 RX ADMIN — LEVOTHYROXINE SODIUM 25 MCG: 25 TABLET ORAL at 08:19

## 2023-05-19 RX ADMIN — PANTOPRAZOLE SODIUM 40 MG: 40 TABLET, DELAYED RELEASE ORAL at 05:51

## 2023-05-19 ASSESSMENT — PAIN SCALES - GENERAL: PAINLEVEL_OUTOF10: 0

## 2023-05-19 NOTE — DISCHARGE INSTRUCTIONS
Discharge to:  back to facility  Diet: Regular  Activity: As tolerated     Stop taking Ativan, Keppra upon discharge  Continue all of your home medications as prescribed  Be compliant with medications      Follow up  Follow up with PCP/ visiting physician in 1 week for follow up. It is very important that you keep this appointment.       Electronically signed by Kaylee John MD on 5/19/2023 at 12:28 PM

## 2023-05-19 NOTE — PROGRESS NOTES
[address] [place] [address] [facility]  Progress Note    SUBJECTIVE:  Chief Complaint   Patient presents with    Altered Mental Status     Here yesterday for same and discharged home. Increasing confusion since     The patient feels better. She had a mild headache yesterday. She was to go home. No fever. Review of systems:  As stated above in the chief complaint, otherwise negative. Medications:  Scheduled Meds:   propranolol  20 mg Oral QAM    levothyroxine  25 mcg Oral QAM    lisinopril  40 mg Oral QAM    atorvastatin  20 mg Oral Nightly    cetirizine  10 mg Oral Nightly    gabapentin  300 mg Oral TID    mirtazapine  15 mg Oral Nightly    latanoprost  1 drop Both Eyes Nightly    OLANZapine  10 mg Oral Nightly    risperiDONE  0.5 mg Oral BID    levETIRAcetam  250 mg Oral BID    pantoprazole  40 mg Oral QAM AC    sodium chloride flush  5-40 mL IntraVENous 2 times per day     Continuous Infusions:   sodium chloride       PRN Meds:sodium chloride flush, sodium chloride, ondansetron **OR** ondansetron, polyethylene glycol, acetaminophen **OR** acetaminophen    OBJECTIVE:  /62   Pulse 86   Temp 97.4 °F (36.3 °C) (Oral)   Resp 16   Ht 5' 4\" (1.626 m)   Wt 181 lb 3 oz (82.2 kg)   SpO2 95%   BMI 31.10 kg/m²   Temp  Av.8 °F (36.6 °C)  Min: 97.4 °F (36.3 °C)  Max: 98 °F (36.7 °C)  Constitutional: The patient is ambulating with a walker. Awake and in no distress. Skin: Warm and dry. No rashes were noted. HEENT: Round and reactive pupils. Moist mucous membranes. No ulcerations or thrush. Neck: Supple to movements. Chest: No respiratory distress. No crackles. Cardiovascular: Sounds rhythmic and regular. Abdomen: Positive bowel sounds to auscultation. Benign to palpation. Extremities: No edema.   Lines: Peripheral.    Laboratory and Tests Review:  Lab Results   Component Value Date    WBC 11.9 (H) 2023    WBC 10.0 2023    WBC 10.4 2023    HGB 14.0 2023

## 2023-05-19 NOTE — DISCHARGE INSTR - COC
Continuity of Care Form    Patient Name: Ángel Crowder   :  1960  MRN:  [id number]    Admit date:  2023  Discharge date:  23      Code Status Order: Full Code   Advance Directives:     Admitting Physician:  Priyank Spring DO  PCP: [facility]    Discharging Nurse: Patricia Melara Unit/Room#: 0541/1364-D  Discharging Unit Phone Number: [phone]      Emergency Contact:   Extended Emergency Contact Information  Primary Emergency Contact: [address] [facility] [address]  Phone: [phone]  Mobile Phone: [phone]  Relation: Brother/Sister   needed? No  Secondary Emergency Contact: PatriaLuDiane  Mobile Phone: [phone]  Relation: Lay Caregiver    Past Surgical History:  Past Surgical History:   Procedure Laterality Date    TUBAL LIGATION Bilateral        Immunization History: There is no immunization history for the selected administration types on file for this patient.     Active Problems:  Patient Active Problem List   Diagnosis Code    Altered mental status, unspecified R41.82    Altered mental status R41.82    Acute encephalopathy G93.40    Paranoid schizophrenia ([facility] [address] F20.0    Chronic hypertension I10       Isolation/Infection:   Isolation            No Isolation          Patient Infection Status       Infection Onset Added Last Indicated Last Indicated By Review Planned Expiration Resolved Resolved By    None active    Resolved    COVID-19 (Rule Out) 23 Respiratory Panel, Molecular, with COVID-19 (Restricted: peds pts or suitable admitted adults) (Ordered)   23 Rule-Out Test Resulted    COVID-19 (Rule Out) 23 COVID-19, Rapid (Ordered)   23 Rule-Out Test Resulted    COVID-19 (Rule Out) 21 Respiratory Panel, Molecular, with COVID-19 (Restricted: peds pts or suitable admitted adults) (Ordered)   21 Rule-Out Test Resulted    COVID-19 (Rule Out)

## 2023-05-19 NOTE — PROGRESS NOTES
Nurse to nurse called to [facility] at [facility]

## 2023-05-19 NOTE — CARE COORDINATION
PT notes reviewed with patient's sister Saroj Rosales and with Ayanna Moore,  of [place]. Patient unfortunately has not demonstrated the levels of independence to return to setting. Both are agreeable , as is patient, to a short term rehab stay. Facility choices are offered and selection of [facility] is made. Call placed to [facility] with facility, referral initiated. Patient is accepted, can transfer today. IDANIA initiated, envelope completed with demos, transport forms and [id number]. Non emergency transportation has been arranged with Marilee Brown w/kayla Price ,  time 4 PM .  Patient, facility liaison and bedside nurse notified of scheduled  time. [place].  Kristi, MSN RN  [facility] Case Management  [phone]

## 2023-05-19 NOTE — DISCHARGE SUMMARY
[facility] Physician Discharge Summary       [facility]  [phone]    Schedule an appointment as soon as possible for a visit in 1 week(s)  hospital follow up    [address] [facility] [address]  [phone]          Activity level: As tolerated     Dispo: PARMJIT    Condition on discharge: Stable     Patient ID:  Maria T Ramírez  [id number]  70 y.o.  1960    Admit date: 5/16/2023    Discharge date and time:  5/19/2023  2:38 PM    Admission Diagnoses: Principal Problem:    Altered mental status  Active Problems:    Acute encephalopathy    Paranoid schizophrenia ([facility] [address]    Chronic hypertension  Resolved Problems:    * No resolved hospital problems. *      Discharge Diagnoses: Active Problems:    Acute encephalopathy, multifactorial, 2/2 viral meningitis in the setting of schizophrenia   Viral meningitis    Paranoid schizophrenia ([facility] [address]    Chronic hypertension, controlled    Consults:  IP CONSULT TO INFECTIOUS DISEASES    Hospital Course:   Patient Maria T Ramírez is a 70 y.o. presented with Delirium [R41.0]  Altered mental status [R41.82]  Febrile illness [R50.9]    This patient is a 58-year-old female with past medical history pertinent for paranoid schizophrenia as well as hypertension and tobacco abuse who resides at [address] in [place] and who was transported here over concerns of altered mental status. On my interview in the ER, patient did not verbally answer any questions that were asked of her. She opened her eyes and looked at me and shook her head. Therefore, all information was gained from the medical record and from the limited paperwork provided by the [facility]. There were concerns over weakness, low-grade fever, and reported altered mental status. She was initially transported to the ER on 5/11 with right-sided facial swelling, CT showed mild soft tissue thickening with edema but no discrete lesion or mass.   The patient

## 2023-05-19 NOTE — PROGRESS NOTES
Physical Therapy  Facility/Department: [address] INTERNAL MEDICINE 2  Daily Treatment Note  NAME: Naty Shipman  : 1960  MRN: [id number]    Date of Service: 2023          Patient Diagnosis(es): The primary encounter diagnosis was Delirium. A diagnosis of Febrile illness was also pertinent to this visit. Evaluating Therapist: Etienne Cho PT     Room #:  0821/8999-B  Diagnosis:  Delirium [R41.0]  Altered mental status [R41.82]  Febrile illness [R50.9]  PMHx/PSHx:  paranoid schizophrenia, HTN  Precautions:  falls, alarm     Social:  Pt lives in a group home. Ambulates with ww. Initial Evaluation  Date: 23 Treatment     23 Short Term/ Long Term   Goals   Was pt agreeable to Eval/treatment? yes  yes     Does pt have pain? No c/o pain  no c/o pain     Bed Mobility  Rolling: SBA  Supine to sit: SBA  Sit to supine: NT  Scooting: SBA   NT pt up in chair independent   Transfers Sit to stand: min assist  Stand to sit: min assist  Stand pivot: min assist  sit to stand min assist  Stand to sit mod assist due to decreased safety SBA   Ambulation    20 feet with ww with min assist. Assist to guide walker  40 feet x2 with ww min assist 100 feet with ww with SBA   Stair Negotiation  Ascended and descended  NT  5 steps with 1 rail min to mod assist. Cues for foot placement and safety  18 with rail with SBA   LE strength     3+/5     4/5   balance      fair       AM-PAC Raw score                       Patient education  Pt was educated on safety with transfers, safety with foot placement on stairs    Patient response to education:   Pt verbalized understanding Pt demonstrated skill Pt requires further education in this area   no With assist yes     Additional Comments: Pt with decrease safety. When approaching chair to sit reaches for chair and begins to sit before completely to chair. Pt with increased time to complete mobility. Decreased step length and foot clearance with ambulation.  Cues

## 2023-05-20 LAB
BACTERIA BLD CULT ORG #2: NORMAL
BACTERIA BLD CULT: NORMAL

## 2023-05-21 LAB — BACTERIA BLD CULT: NORMAL

## 2023-05-22 LAB
BACTERIA CSF CULT: NORMAL
GRAM STN SPEC: NORMAL